# Patient Record
Sex: MALE | URBAN - METROPOLITAN AREA
[De-identification: names, ages, dates, MRNs, and addresses within clinical notes are randomized per-mention and may not be internally consistent; named-entity substitution may affect disease eponyms.]

---

## 2024-07-22 DIAGNOSIS — S02.651A: Primary | ICD-10-CM

## 2024-07-22 NOTE — PROGRESS NOTES
Updates:  - Patient had tooth extracted 7/9/24 and 7/15/24 patient reports he was eating when he experienced sharp pain to right mandible  - Radiograph confirmed right mandible fracture. Plan for open reduction internal fixation of mandible in OR 8/1/24

## 2024-07-23 ENCOUNTER — HOSPITAL ENCOUNTER (OUTPATIENT)
Facility: HOSPITAL | Age: 74
Setting detail: OUTPATIENT SURGERY
End: 2024-07-23
Attending: DENTIST | Admitting: DENTIST
Payer: MEDICARE

## 2024-07-23 DIAGNOSIS — S02.601A: Primary | ICD-10-CM

## 2024-07-24 ENCOUNTER — CLINICAL SUPPORT (OUTPATIENT)
Dept: PREADMISSION TESTING | Facility: HOSPITAL | Age: 74
End: 2024-07-24
Payer: MEDICARE

## 2024-07-24 RX ORDER — SOTALOL HYDROCHLORIDE 120 MG/1
1 TABLET ORAL EVERY 12 HOURS
COMMUNITY
Start: 2023-09-25

## 2024-07-24 RX ORDER — AMOXICILLIN 500 MG/1
500 CAPSULE ORAL EVERY 8 HOURS
COMMUNITY
Start: 2024-07-09

## 2024-07-24 RX ORDER — FUROSEMIDE 40 MG/1
2 TABLET ORAL DAILY
COMMUNITY
Start: 2023-10-09

## 2024-07-24 RX ORDER — DULAGLUTIDE 1.5 MG/.5ML
1.5 INJECTION, SOLUTION SUBCUTANEOUS
COMMUNITY

## 2024-07-24 RX ORDER — APIXABAN 2.5 MG/1
1 TABLET, FILM COATED ORAL 2 TIMES DAILY
COMMUNITY

## 2024-07-24 RX ORDER — INSULIN LISPRO 100 [IU]/ML
INJECTION, SOLUTION INTRAVENOUS; SUBCUTANEOUS AS NEEDED
COMMUNITY

## 2024-07-24 RX ORDER — LEVOTHYROXINE SODIUM 25 UG/1
1 TABLET ORAL
COMMUNITY

## 2024-07-24 RX ORDER — INSULIN GLARGINE 300 U/ML
INJECTION, SOLUTION SUBCUTANEOUS
COMMUNITY

## 2024-07-24 RX ORDER — GABAPENTIN 300 MG/1
1 CAPSULE ORAL 2 TIMES DAILY
COMMUNITY
Start: 2024-06-03

## 2024-07-24 RX ORDER — GLUCAGON INJECTION, SOLUTION 1 MG/.2ML
INJECTION, SOLUTION SUBCUTANEOUS AS NEEDED
COMMUNITY
Start: 2024-04-24

## 2024-07-24 RX ORDER — ATORVASTATIN CALCIUM 40 MG/1
1 TABLET, FILM COATED ORAL NIGHTLY
COMMUNITY
Start: 2024-06-10

## 2024-07-24 RX ORDER — ESOMEPRAZOLE MAGNESIUM 40 MG/1
40 CAPSULE, DELAYED RELEASE ORAL
COMMUNITY
Start: 2022-08-25

## 2024-07-24 RX ORDER — FAMOTIDINE 40 MG/1
40 TABLET, FILM COATED ORAL NIGHTLY
COMMUNITY

## 2024-07-24 RX ORDER — HYDROCODONE BITARTRATE AND ACETAMINOPHEN 5; 325 MG/1; MG/1
1 TABLET ORAL EVERY 6 HOURS PRN
COMMUNITY

## 2024-07-24 NOTE — CPM/PAT NURSE NOTE
CPM/PAT Nurse Note      Name: Masoud Barger (Masoud Barger)  /Age: 1950/74 y.o.       Past Medical History:   Diagnosis Date    A-fib (Multi)     s/p DCCV in 2020    Anemia     Asthma (HHS-HCC)     related to allergies    Bradycardia     CHF (congestive heart failure) (Multi)     Chronic diastolic heart failure    Complete heart block (Multi)     s/p Medtr[employer] DC PPM placement in 3/2018,    Coronary artery disease     s/p 3 vessel CABG and mitral valve repair in 2018    ED (erectile dysfunction)     GERD (gastroesophageal reflux disease)     Hyperlipidemia     Hypertension     Palpitations     Presence of cardiac pacemaker     Rectal bleeding     Sleep apnea     uses CPAP    SSS (sick sinus syndrome) (Multi)     Stroke (Multi)     Testicular cancer (Multi)     Thyroid nodule     Type 2 diabetes mellitus (Multi)        Past Surgical History:   Procedure Laterality Date    CARDIAC CATHETERIZATION  2003    CARDIAC CATHETERIZATION  2018    CARDIAC CATHETERIZATION  2021    CARDIOVERSION  2020    CARDIOVERSION  2020    CHOLECYSTECTOMY  2006    CORONARY ARTERY BYPASS GRAFT  2018    HAND SURGERY      INSERT / REPLACE / REMOVE PACEMAKER  2018    MITRAL VALVE REPAIR      ORCHIECTOMY SIMPLE / PARTIAL / RADICAL W/ SCROTAL / INGUINAL APPROACH         Patient  has no history on file for sexual activity.    No family history on file.    Not on File    Prior to Admission medications    Medication Sig Start Date End Date Taking? Authorizing Provider   amoxicillin (Amoxil) 500 mg capsule Take 1 capsule (500 mg) by mouth every 8 hours. until finished 24  Yes Historical Provider, MD   atorvastatin (Lipitor) 40 mg tablet Take 1 tablet (40 mg) by mouth once daily at bedtime. 6/10/24  Yes Historical Provider, MD   esomeprazole (NexIUM) 40 mg DR capsule Take 1 capsule (40 mg) by mouth once daily. 22  Yes Historical Provider, MD   furosemide (Lasix) 40 mg tablet  Take 2 tablets (80 mg) by mouth once daily. 10/9/23  Yes Historical Provider, MD   gabapentin (Neurontin) 300 mg capsule Take 1 capsule (300 mg) by mouth 2 times a day. 6/3/24  Yes Historical Provider, MD   Gvoke HypoPen 2-Pack 1 mg/0.2 mL auto-injector if needed. 4/24/24  Yes Historical Provider, MD   sotalol (Betapace) 120 mg tablet Take 1 tablet (120 mg) by mouth every 12 hours. 9/25/23  Yes Historical Provider, MD   aspirin 81 mg capsule Take 81 mg by mouth once daily.    Historical Provider, MD   dulaglutide (Trulicity) 1.5 mg/0.5 mL pen injector injection Inject 1.5 mg under the skin 1 (one) time per week.    Historical Provider, MD   Eliquis 2.5 mg tablet Take 1 tablet (2.5 mg) by mouth 2 times a day.    Historical Provider, MD   famotidine (Pepcid) 40 mg tablet Take 1 tablet (40 mg) by mouth once daily at bedtime.    Historical Provider, MD   HumaLOG KwikPen Insulin 100 unit/mL injection Inject under the skin if needed.    Historical Provider, MD   HYDROcodone-acetaminophen (Norco) 5-325 mg tablet Take 1 tablet by mouth every 6 hours if needed.    Historical Provider, MD   levothyroxine (Synthroid, Levoxyl) 25 mcg tablet Take 1 tablet (25 mcg) by mouth once daily in the morning. Take before meals.    Historical Provider, MD Salgado SoloStar U-300 Insulin 300 unit/mL (1.5 mL) injection INJECT 95 UNITS SUBCUTANEOUSLY IN THE MORNING    Historical Provider, MD        PAT ROS     DASI Risk Score    No data to display       Caprini DVT Assessment    No data to display       Modified Frailty Index    No data to display       CHADS2 Stroke Risk         N/A 3 to 100%: High Risk   2 to < 3%: Medium Risk   0 to < 2%: Low Risk     Last Change: N/A          This score determines the patient's risk of having a stroke if the patient has atrial fibrillation.        This score is not applicable to this patient. Components are not calculated.          Revised Cardiac Risk Index    No data to display       Apfel Simplified  Score    No data to display       Risk Analysis Index Results This Encounter    No data found in the last 1 encounters.       Scheduled for mandible/maxilla fracture ORIF-right with Dr. Muller on 24.  PCP: David Gary MD P: [phone], F: [phone]    Cardiology: Ranjan José MD P: [phone], F: [phone] seen for 6 month follow up.    EP: Tara Costa MD [facility] -LOV 23 seen for device management    Endocrinology: Ian Kruse MD [facility], P: [phone], F: [phone]  -Stress Test: 24  CONCLUSIONS:    1. SPECT Perfusion Study: Normal.    2. There is no scintigraphic evidence for inducible ischemia.    3. No evidence of scarred myocardium.    4. Left ventricle is mildly dilated. The left ventricle systolic   function is normal.   Gated Stress FBP Gated Rest FBP    LVEF % 40               64     -EC24  Wide QRS rhythm  Right Bundle branch block  T wave abnormality, consider inferolateral ischemia   Abnormal ECG    -ECHO: 23  CONCLUSIONS:   - Technically difficult exam due to body habitus.   - Exam indication: ABNORMAL EKG/CP   - The left ventricle is normal in size. Left ventricular systolic function is   normal. EF = 56 ± 5% (2D 4-ch.) Definity contrast used for endocardial border   detection.   - The right ventricle is mildly dilated. Right ventricular systolic function is   low normal.   - The left atrial cavity is moderately dilated.   - Post mitral valve repair. There is trace (trace - 1+) mitral valve   regurgitation. There is no mitral stenosis. The peak gradient is 13 mmHg and the   mean gradient is 4 mmHg.   - There is moderate (2+) tricuspid valve regurgitation.   - Estimated right ventricular systolic pressure is 51 mmHg consistent with   moderate pulmonary hypertension. Estimated right atrial pressure is 15 mmHg based   on IVC assessment.   - Exam was compared with the prior  echocardiographic exam performed on    02-17-20. Prior examination was transesophageal echocardiogram. No significant   change in LVEF or tricuspid regurgitation.     -Cardiac Cath: 09/30/21  Conclusion:   LV ejection fraction 55 to 60% with moderate hypokinesis of basal inferior wall.   Elevated LVEDP at 19 mmHg.   Severe two-vessel native coronary artery disease involving LAD and RCA.   All patent grafts including SVG to diagonal 1 (no SVG graft to OM), LIMA to distal LAD,   OSCAR to right PDA.     Nurse Plan of Action:   Risk stratification sent to cardiology office on 07/24/24.  Requested last office note from Dr. Cuevas and Duong  Medication Instructions:  Instructed to hold vitamins, supplements, and NSAIDs 7 days prior to surgery and Trulicity last dose.      Yasmeen Worley RN  Pre Admission Testing

## 2024-07-29 ENCOUNTER — PRE-ADMISSION TESTING (OUTPATIENT)
Dept: PREADMISSION TESTING | Facility: HOSPITAL | Age: 74
End: 2024-07-29
Payer: MEDICARE

## 2024-07-29 ENCOUNTER — HOSPITAL ENCOUNTER (OUTPATIENT)
Dept: RADIOLOGY | Facility: HOSPITAL | Age: 74
Discharge: HOME | End: 2024-07-29
Payer: MEDICARE

## 2024-07-29 ENCOUNTER — APPOINTMENT (OUTPATIENT)
Dept: PREADMISSION TESTING | Facility: HOSPITAL | Age: 74
End: 2024-07-29
Payer: MEDICARE

## 2024-07-29 VITALS
HEART RATE: 71 BPM | WEIGHT: 315 LBS | DIASTOLIC BLOOD PRESSURE: 50 MMHG | BODY MASS INDEX: 45.1 KG/M2 | HEIGHT: 70 IN | SYSTOLIC BLOOD PRESSURE: 109 MMHG | TEMPERATURE: 97.4 F | OXYGEN SATURATION: 96 %

## 2024-07-29 DIAGNOSIS — S02.651A: ICD-10-CM

## 2024-07-29 DIAGNOSIS — I10 PRIMARY HYPERTENSION: ICD-10-CM

## 2024-07-29 DIAGNOSIS — Z01.818 PREOPERATIVE TESTING: Primary | ICD-10-CM

## 2024-07-29 DIAGNOSIS — R79.9 ABNORMAL FINDING OF BLOOD CHEMISTRY, UNSPECIFIED: ICD-10-CM

## 2024-07-29 DIAGNOSIS — R79.1 ABNORMAL COAGULATION PROFILE: ICD-10-CM

## 2024-07-29 LAB
ANION GAP SERPL CALC-SCNC: 17 MMOL/L (ref 10–20)
APTT PPP: 33 SECONDS (ref 27–38)
BUN SERPL-MCNC: 32 MG/DL (ref 6–23)
CALCIUM SERPL-MCNC: 9 MG/DL (ref 8.6–10.6)
CHLORIDE SERPL-SCNC: 92 MMOL/L (ref 98–107)
CO2 SERPL-SCNC: 26 MMOL/L (ref 21–32)
CREAT SERPL-MCNC: 2.54 MG/DL (ref 0.5–1.3)
EGFRCR SERPLBLD CKD-EPI 2021: 26 ML/MIN/1.73M*2
ERYTHROCYTE [DISTWIDTH] IN BLOOD BY AUTOMATED COUNT: 17 % (ref 11.5–14.5)
EST. AVERAGE GLUCOSE BLD GHB EST-MCNC: 146 MG/DL
GLUCOSE SERPL-MCNC: 177 MG/DL (ref 74–99)
HBA1C MFR BLD: 6.7 %
HCT VFR BLD AUTO: 33.2 % (ref 41–52)
HGB BLD-MCNC: 10.2 G/DL (ref 13.5–17.5)
INR PPP: 1.5 (ref 0.9–1.1)
MCH RBC QN AUTO: 22.1 PG (ref 26–34)
MCHC RBC AUTO-ENTMCNC: 30.7 G/DL (ref 32–36)
MCV RBC AUTO: 72 FL (ref 80–100)
NRBC BLD-RTO: 0 /100 WBCS (ref 0–0)
PLATELET # BLD AUTO: 249 X10*3/UL (ref 150–450)
POTASSIUM SERPL-SCNC: 4.3 MMOL/L (ref 3.5–5.3)
PROTHROMBIN TIME: 17.5 SECONDS (ref 9.8–12.8)
RBC # BLD AUTO: 4.62 X10*6/UL (ref 4.5–5.9)
SODIUM SERPL-SCNC: 131 MMOL/L (ref 136–145)
WBC # BLD AUTO: 17.5 X10*3/UL (ref 4.4–11.3)

## 2024-07-29 PROCEDURE — 76376 3D RENDER W/INTRP POSTPROCES: CPT | Performed by: RADIOLOGY

## 2024-07-29 PROCEDURE — 83036 HEMOGLOBIN GLYCOSYLATED A1C: CPT

## 2024-07-29 PROCEDURE — 70486 CT MAXILLOFACIAL W/O DYE: CPT | Performed by: RADIOLOGY

## 2024-07-29 PROCEDURE — 76377 3D RENDER W/INTRP POSTPROCES: CPT

## 2024-07-29 PROCEDURE — 85027 COMPLETE CBC AUTOMATED: CPT

## 2024-07-29 PROCEDURE — 80048 BASIC METABOLIC PNL TOTAL CA: CPT

## 2024-07-29 PROCEDURE — 70486 CT MAXILLOFACIAL W/O DYE: CPT

## 2024-07-29 PROCEDURE — 85610 PROTHROMBIN TIME: CPT

## 2024-07-29 PROCEDURE — 36415 COLL VENOUS BLD VENIPUNCTURE: CPT

## 2024-07-29 PROCEDURE — 99205 OFFICE O/P NEW HI 60 MIN: CPT | Performed by: NURSE PRACTITIONER

## 2024-07-29 ASSESSMENT — DUKE ACTIVITY SCORE INDEX (DASI)
CAN YOU WALK INDOORS, SUCH AS AROUND YOUR HOUSE: YES
CAN YOU WALK A BLOCK OR TWO ON LEVEL GROUND: YES
DASI METS SCORE: 5.1
CAN YOU DO YARD WORK LIKE RAKING LEAVES, WEEDING OR PUSHING A MOWER: NO
CAN YOU DO MODERATE WORK AROUND THE HOUSE LIKE VACUUMING, SWEEPING FLOORS OR CARRYING GROCERIES: YES
CAN YOU RUN A SHORT DISTANCE: NO
CAN YOU DO LIGHT WORK AROUND THE HOUSE LIKE DUSTING OR WASHING DISHES: YES
TOTAL_SCORE: 18.95
CAN YOU CLIMB A FLIGHT OF STAIRS OR WALK UP A HILL: YES
CAN YOU TAKE CARE OF YOURSELF (EAT, DRESS, BATHE, OR USE TOILET): YES
CAN YOU DO HEAVY WORK AROUND THE HOUSE LIKE SCRUBBING FLOORS OR LIFTING AND MOVING HEAVY FURNITURE: NO
CAN YOU HAVE SEXUAL RELATIONS: NO
CAN YOU PARTICIPATE IN MODERATE RECREATIONAL ACTIVITIES LIKE GOLF, BOWLING, DANCING, DOUBLES TENNIS OR THROWING A BASEBALL OR FOOTBALL: NO
CAN YOU PARTICIPATE IN STRENOUS SPORTS LIKE SWIMMING, SINGLES TENNIS, FOOTBALL, BASKETBALL, OR SKIING: NO

## 2024-07-29 ASSESSMENT — LIFESTYLE VARIABLES: SMOKING_STATUS: NONSMOKER

## 2024-07-29 NOTE — CPM/PAT H&P
CPM/PAT Evaluation       Name: Masoud Barger (Masoud Barger)  /Age: 1950/74 y.o.     Visit Type:   In-Person       Chief Complaint: mandible fracture    HPI  The patient is a 74 year old  male with mandible fractures who presents for perioperative evaluation in anticipation of Open Reduction Internal Fixation Mandible -Right on 24 with Dr. Muller.    Past Medical History:   Diagnosis Date    A-fib (Multi)     s/p DCCV in 2020    Anemia     Arthritis     Asthma (HHS-HCC)     related to allergies    Bradycardia     CHF (congestive heart failure) (Multi)     Chronic diastolic heart failure    Chronic pain disorder     CKD (chronic kidney disease)     Complete heart block (Multi)     s/p [employer] DC PPM placement in 3/2018,    Coronary artery disease     s/p 3 vessel CABG and mitral valve repair in 2018    ED (erectile dysfunction)     GERD (gastroesophageal reflux disease)     History of blood transfusion     after CABG in     Hyperlipidemia     Hypertension     Hypothyroidism     Obesity     Palpitations     Peripheral neuropathy     Presence of cardiac pacemaker     Rectal bleeding     with anticoagulation in     Sleep apnea     uses CPAP    SSS (sick sinus syndrome) (Multi)     Stroke (Multi)     Testicular cancer (Multi)     Thyroid nodule     Type 2 diabetes mellitus (Multi)     Vision loss        Past Surgical History:   Procedure Laterality Date    CARDIAC CATHETERIZATION  2003    CARDIAC CATHETERIZATION  2018    CARDIAC CATHETERIZATION  2021    CARDIOVERSION  2020    CARDIOVERSION  2020    CHOLECYSTECTOMY  2006    CORONARY ARTERY BYPASS GRAFT  2018    HAND SURGERY      INSERT / REPLACE / REMOVE PACEMAKER  2018    MITRAL VALVE REPAIR      ORCHIECTOMY SIMPLE / PARTIAL / RADICAL W/ SCROTAL / INGUINAL APPROACH         Patient Sexual activity questions deferred to the physician.    Family History   Problem Relation Name Age of  Onset    Heart disease Mother      Hypertension Mother      Cancer Father      Hypertension Sister      Heart attack Brother      Hypertension Brother         Allergies   Allergen Reactions    Levaquin [Levofloxacin] Other     Extreme leg pain    Shellfish Derived Nausea/vomiting    Voltaren [Diclofenac Sodium] Other     Elevated blood pressure    Adhesive Tape-Silicones Rash     Rash with silk tape       Prior to Admission medications    Medication Sig Start Date End Date Taking? Authorizing Provider   amoxicillin (Amoxil) 500 mg capsule Take 1 capsule (500 mg) by mouth every 8 hours. until finished 7/9/24   Historical Provider, MD   aspirin 81 mg capsule Take 81 mg by mouth once daily.    Historical Provider, MD   atorvastatin (Lipitor) 40 mg tablet Take 1 tablet (40 mg) by mouth once daily at bedtime. 6/10/24   Historical Provider, MD   dulaglutide (Trulicity) 1.5 mg/0.5 mL pen injector injection Inject 1.5 mg under the skin 1 (one) time per week.    Historical Provider, MD   Eliquis 2.5 mg tablet Take 1 tablet (2.5 mg) by mouth 2 times a day.    Historical Provider, MD   esomeprazole (NexIUM) 40 mg DR capsule Take 1 capsule (40 mg) by mouth once daily. 8/25/22   Historical Provider, MD   famotidine (Pepcid) 40 mg tablet Take 1 tablet (40 mg) by mouth once daily at bedtime.    Historical Provider, MD   furosemide (Lasix) 40 mg tablet Take 2 tablets (80 mg) by mouth once daily. 10/9/23   Historical Provider, MD   gabapentin (Neurontin) 300 mg capsule Take 1 capsule (300 mg) by mouth 2 times a day. 6/3/24   Historical Provider, MD   Gvoke HypoPen 2-Pack 1 mg/0.2 mL auto-injector if needed. 4/24/24   Historical Provider, MD Kristen Gallardo Insulin 100 unit/mL injection Inject under the skin if needed.    Historical Provider, MD   HYDROcodone-acetaminophen (Norco) 5-325 mg tablet Take 1 tablet by mouth every 6 hours if needed.    Historical Provider, MD   levothyroxine (Synthroid, Levoxyl) 25 mcg tablet Take 1  tablet (25 mcg) by mouth once daily in the morning. Take before meals.    Historical Provider, MD   sotalol (Betapace) 120 mg tablet Take 1 tablet (120 mg) by mouth every 12 hours. 9/25/23   Historical Provider, MD Naomi Antunez U-300 Insulin 300 unit/mL (1.5 mL) injection INJECT 95 UNITS SUBCUTANEOUSLY IN THE MORNING    Historical Provider, MD MARADIAGA ROS:   Constitutional:   neg    Neuro/Psych:   neg    Eyes:   neg     use of corrective lenses  Ears:   neg    Nose:   neg    Mouth:   neg     mouth pain  Throat:   neg    Neck:   neg    Cardio:   neg     peripheral edema  Respiratory:   neg    Endocrine:   neg    GI:   neg    :   neg    Musculoskeletal:    arthralgias   myalgias  Hematologic:   neg    Skin:  neg        Physical Exam  Vitals reviewed.   Constitutional:       Appearance: Normal appearance. He is obese.   HENT:      Head: Normocephalic and atraumatic.      Nose: Nose normal.      Mouth/Throat:      Mouth: Mucous membranes are moist.      Pharynx: Oropharynx is clear.   Eyes:      Extraocular Movements: Extraocular movements intact.      Pupils: Pupils are equal, round, and reactive to light.   Cardiovascular:      Rate and Rhythm: Normal rate and regular rhythm.      Pulses: Normal pulses.      Heart sounds: Normal heart sounds.   Pulmonary:      Effort: Pulmonary effort is normal.      Breath sounds: Normal breath sounds.   Musculoskeletal:         General: Normal range of motion.      Cervical back: Normal range of motion.      Right lower leg: Edema present.      Left lower leg: Edema present.      Comments: Trace non pitting, improved per patient   Skin:     General: Skin is warm and dry.   Neurological:      General: No focal deficit present.      Mental Status: He is alert and oriented to person, place, and time.      Gait: Gait abnormal.      Comments: Uses cane, motorized scooter   Psychiatric:         Mood and Affect: Mood normal.         Behavior: Behavior normal.          PAT AIRWAY:  "  Airway:     Mallampati::  IV    TM distance::  >3 FB    Neck ROM::  Full   upper dentures      Visit Vitals  /50   Pulse 71   Temp 36.3 °C (97.4 °F) (Temporal)   Ht 1.778 m (5' 10\")   Wt (!) 154 kg (340 lb 2.7 oz)   SpO2 96%   BMI 48.81 kg/m²   Smoking Status Never   BSA 2.76 m²          DASI Risk Score      Flowsheet Row Most Recent Value   DASI SCORE 18.95   METS Score (Will be calculated only when all the questions are answered) 5.1          Caprini DVT Assessment      Flowsheet Row Most Recent Value   DVT Score 19   Current Status Major surgery planned, lasting over 3 hours   History Prior major surgery, Congestive heart failure, Previous malignancy, Stroke   Age 60-75 years   BMI 41-50 (Morbid obesity)          Modified Frailty Index      Flowsheet Row Most Recent Value   Modified Frailty Index Calculator .5454          CHADS2 Stroke Risk  Current as of 54 minutes ago        8.5% 3 to 100%: High Risk   2 to < 3%: Medium Risk   0 to < 2%: Low Risk     No Change          This score determines the patient's risk of having a stroke if the patient has atrial fibrillation.          Points Metrics   0 Has Congestive Heart Failure:  No     Patients with congestive heart failure get 1 point.    Current as of 54 minutes ago   1 Has Hypertension:  Yes     Patients with hypertension get 1 point.    Current as of 54 minutes ago   0 Age:  74     Patients who are 75 years of age or older get 1 point.    Current as of 54 minutes ago   1 Has Diabetes:  Yes      Patients with diabetes get 1 point.    Current as of 54 minutes ago   2 Had Stroke:  Yes  Had TIA:  No  Had Thromboembolism:  No     Patients who have had a stroke, TIA, or thromboembolism get 2 points.    Current as of 54 minutes ago             Revised Cardiac Risk Index      Flowsheet Row Most Recent Value   Revised Cardiac Risk Calculator 4          Apfel Simplified Score      Flowsheet Row Most Recent Value   Apfel Simplified Score Calculator 2          Risk " Analysis Index Results This Encounter    No data found in the last 1 encounters.       Stop Bang Score      Flowsheet Row Most Recent Value   Do you snore loudly? 1   Do you often feel tired or fatigued after your sleep? 1   Has anyone ever observed you stop breathing in your sleep? 1   Do you have or are you being treated for high blood pressure? 1   Recent BMI (Calculated) 0   Age older than 50 years old? 1=Yes   Is your neck circumference greater than 17 inches (Male) or 16 inches (Female)? 0   Gender - Male 1=Yes        No results found for this or any previous visit (from the past 168 hour(s)).     Diagnostic Results    -Stress Test: 24  CONCLUSIONS:    1. SPECT Perfusion Study: Normal.    2. There is no scintigraphic evidence for inducible ischemia.    3. No evidence of scarred myocardium.    4. Left ventricle is mildly dilated. The left ventricle systolic   function is normal.   Gated Stress FBP Gated Rest FBP    LVEF % 40               64      -EC24  Wide QRS rhythm  Right Bundle branch block  T wave abnormality, consider inferolateral ischemia   Abnormal ECG     -ECHO: 23  CONCLUSIONS:   - Technically difficult exam due to body habitus.   - Exam indication: ABNORMAL EKG/CP   - The left ventricle is normal in size. Left ventricular systolic function is   normal. EF = 56 ± 5% (2D 4-ch.) Definity contrast used for endocardial border   detection.   - The right ventricle is mildly dilated. Right ventricular systolic function is   low normal.   - The left atrial cavity is moderately dilated.   - Post mitral valve repair. There is trace (trace - 1+) mitral valve   regurgitation. There is no mitral stenosis. The peak gradient is 13 mmHg and the   mean gradient is 4 mmHg.   - There is moderate (2+) tricuspid valve regurgitation.   - Estimated right ventricular systolic pressure is 51 mmHg consistent with   moderate pulmonary hypertension. Estimated right atrial pressure is 15 mmHg based   on IVC  assessment.   - Exam was compared with the prior CC echocardiographic exam performed on   02-17-20. Prior examination was transesophageal echocardiogram. No significant   change in LVEF or tricuspid regurgitation.      -Cardiac Cath: 09/30/21  Conclusion:   LV ejection fraction 55 to 60% with moderate hypokinesis of basal inferior wall.   Elevated LVEDP at 19 mmHg.   Severe two-vessel native coronary artery disease involving LAD and RCA.   All patent grafts including SVG to diagonal 1 (no SVG graft to OM), LIMA to distal LAD,   OSCAR to right PDA.      Assessment and Plan:     Anesthesia:  The patient notes anesthesia complications in the past related to PONV. No issues with most recent surgeries.     Neuro:   The patient has diagnoses or significant findings on chart review or clinical presentation and evaluation significant for history of CVA in 2002. No residual issues. On aspirin 81mg by mouth daily. . The patient is at increased risk for postoperative delirium secondary to age 65 or older, decreased functional status, polypharmacy, renal Insufficiency. The patient is at increased risk for perioperative stroke secondary to prior CVA/TIA, a-fib, chronic renal failure, hypertension , perioperative interruption of antithrombotic, increased age, hyperlipidemia, diabetes mellitus, general anesthesia, operative time >2.5 hours. Handouts for preoperative brain exercises given to patient.    HEENT/Airway  The patient has diagnoses, significant findings on chart review, clinical presentation or evaluation of obesity, short thick neck. No documented or reported history of airway difficulty.     Cardiovascular  The patient is scheduled for non-cardiac surgery associated with elevated risk. The patient has no major cardiac contraindications to non- cardiac surgery.  RCRI  The patient meets 3 or more RCRI criteria and therefore is at high risk for major adverse cardiac complications.  METS  The patient's functional capacity  capacity is greater than 4 METS.  EKG  The patient has no acute EKG or echocardiographic changes concerning for myocardial ischemia. Known CAD s/p 3 vessel CABG 2/2018. Recent negative nuclear stress 6/27/24.  Heart Failure  The patient has a known history of heart failure, which is currently compensated, due to diastolic dysfunction. Appears euvolemic on today's exam.  Hypertension Evaluation  The patient has a known history of hypertension that is controlled.  Patient's hypertension is most consistent with stage 1.  Heart Rhythm Evaluation  Patient has a history postoperative  atrial fibrillation, Status post synchronized cardioversion. History of  sick sinus syndrome, status post dual-chamber pacemaker implantation3/2018.  Heart Valve Evaluation  The patient has history of mitral valve repair 2/2018.   Cardiology Evaluation  The patient follows with cardiology, Dr. Ranjan José. Patient was last seen 5/28/24. See media tab for clearance.  EP: Tara Costa MD [facility] 11/29/23 seen for device management     The patient has a 30-day risk for MACE of 3 or greater predictors, 15% risk for cardiac death, nonfatal myocardial infarction, and nonfatal cardiac arrest.  SHOSHANA score which indicates a 0.2% risk of intraoperative or 30-day postoperative MACE (major adverse cardiac event).    Pulmonary   The patient has findings on chart review, clinical presentation and evaluation significant for KULWINDER, asthma. Denies any recent URIs, exacerbations, or hospitalizations. The patient is at increased risk of perioperative pulmonary complications secondary to KULWINDER, advanced age greater than 60, functional dependency, morbid obesity. Patient educated to bring CPAP/BIPAP day of surgery.     Recommend prioritizing non opioid analgesic techniques (regional and local anesthesia, nonsteroidal medications, etc) before the administration of opioids and close monitoring for hypoventilation after surgery due to KULWINDER/supsected KULWINDER. If  intravenous narcotics are needed beyond the immediate meek-operative period, the patient may benefit from continuous pulse oximetry to monitor for hypoxic events till baseline Sp02 is normal on room air and  a respiratory therapy evaluation.    ARISCAT 26, Intermediate, 13.3% risk of in-hospital postoperative pulmonary complications  PRODIGY 27, high risk of respiratory depression episode. Patient given PI sheet for preoperative deep breathing exercises.    Hematology  The patient has diagnoses or significant findings on chart review or clinical presentation and evaluation significant for anemia. CBC obtained  Antiplatelet management   The patient is currently receiving antiplatelet therapy for CAD. The patient was instructed to continue aspirin in the perioperative period.  Anticoagulation management  The patient is currently receiving anticoagulation therapy for history of CVA/TIA, afib. The patient has been instructed to stop Eliquis 3 days prior to surgery. Patient provided with DVT educational handout.      Caprini score 19, high risk of perioperative VTE.     Patient instructed to ambulate as soon as possible postoperatively to decrease thromboembolic risk. Initiate mechanical DVT prophylaxis as soon as possible and initiate chemical prophylaxis when deemed safe from a bleeding standpoint post surgery.     Transfusion Evaluation  T&S not obtained. Low likelihood for perioperative transfusion of blood or blood products.    Gastrointestinal  The patient has diagnoses or significant findings on chart review or clinical presentation and evaluation significant for GERD managed on medication. Recent issue with constipation and nausea related to pain meds. Now relieved with stool softener and enema.     Eat 10- 24,  self-perceived oropharyngeal dysphagia scale (0-40)     Genitourinary  The patient has diagnoses or significant findings on chart review or clinical presentation and evaluation significant for history of  testicular cancer s/p right orchiectomy at age 22. No current  complaints.     Renal  The patient has a history of chronic kidney disease most consistent with stage 3.  Patient's renal function appears unchanged when compared to prior labs. The patient has specific risk factors associated with increased risk of perioperative renal complications related to age greater than 55, male gender, hypertension, diabetes mellitus, CKD. Preventative measures include preoperative hydration.    Musculoskeletal  The patient has diagnoses or significant findings on chart review or clinical presentation and evaluation significant for OA, uses cane. Avoid NSAIDs 7 days prior to surgery.     Endocrine  Diabetes Evaluation  The patient has a history of diabetes mellitus that currently appears controlled. Has patrice RUE.  A1c obtained.  Thyroid Disease Evaluation  The patient has a history of thyroid disease that appears controlled. Last TSH WNL 5/21/24.  Endocrinology: Ian Kruse MD [facility], P: [phone], F: [phone]     ID  No diagnoses or significant findings on chart review or clinical presentation and evaluation.    -Preoperative medication instructions were provided and reviewed with the patient.  Any additional testing or evaluation was explained to the patient.  NPO Instructions were discussed, and the patient's questions were answered prior to conclusion of this encounter. Patient verbalized understanding of preoperative instructions. After Visit Summary given.      7/29 1730 Dr. Muller/Dr. Rivers emailed regarding kidney function.    7/30/24 Spoke with patient states he is feeling unwell. Has had diarrhea since stool softener and enema 3 days ago. He has increased fatigue and is headed to urgent care for further evaluation. Educated patient regarding result of labs and worsening kidney function. May be in the setting of dehydration? Dr. Muller emailed regarding update.    7/31/24.  Patient seen in ER, repeat labs with improved kidney function. Patient was recommended to increase fluid intake and discharged. Spoke with patient today with resolving symptoms, states he feels much better and has been increasing his po intake.  ntains abnormal data COMPREHENSIVE METABOLIC PANEL  Order: [id number]  Component  Ref Range & Units 1 d ago   Protein, Total  6.3 - 8.0 g/dL 7.1   Albumin  3.9 - 4.9 g/dL 3.7 Low    Calcium, Total  8.5 - 10.2 mg/dL 9.0   Bilirubin, Total  0.2 - 1.3 mg/dL 0.7   Comment: Use of this assay is not recommended for patients undergoing treatment with eltrombopag due to the potential for falsely elevated results.   Alkaline Phosphatase  38 - 113 U/L 72   AST  14 - 40 U/L 16   ALT  10 - 54 U/L 17   Glucose  74 - 99 mg/dL 131 High    Comment: The American Diabetes Association (ADA) provides guidance for cutoff values for fasting glucose and random glucose. The ADA defines fasting as no caloric intake for at least 8 hours. Fasting plasma glucose results between 100 to 125 mg/dL indicate increased risk for diabetes (prediabetes).  Fasting plasma glucose results greater than or equal to 126 mg/dL meet the criteria for diagnosis of diabetes. In the absence of unequivocal hyperglycemia, results should be confirmed by repeat testing. In a patient with classic symptoms of hyperglycemia or hyperglycemic crisis, random plasma glucose results greater than or equal to 200 mg/dL meet the criteria for diagnosis of diabetes.  Reference: Standards of Medical Care in Diabetes 2016, American Diabetes Association. Diabetes Care. 2016.39(Suppl 1).   BUN  9 - 24 mg/dL 33 High    Creatinine  0.73 - 1.22 mg/dL 1.89 High    Comment: Use of this assay is not recommended for patients undergoing treatment with phenindione, due to the potential for falsely depressed results.   Sodium  136 - 144 mmol/L 130 Low    Potassium  3.7 - 5.1 mmol/L 3.5 Low    Chloride  98 - 107 mmol/L 93 Low    CO2  22 - 30 mmol/L 30    Anion Gap  8 - 15 mmol/L 7 Low    Estimated Glomerular Filtration Rate  >=60 mL/min/1.73m² 37 Low    COMPLETE BLOOD COUNT AND DIFFERENTIAL  Order: [id number]  Component  Ref Range & Units 1 d ago   WBC  3.70 - 11.00 k/uL 10.44   RBC  4.20 - 6.00 m/uL 4.45   Hemoglobin  13.0 - 17.0 g/dL 9.9 Low    Hematocrit  39.0 - 51.0 % 31.5 Low    MCV  80.0 - 100.0 fL 70.8 Low    MCH  26.0 - 34.0 pg 22.2 Low    MCHC  30.5 - 36.0 g/dL 31.4   RDW-CV  11.5 - 15.0 % 16.6 High    Platelet Count  150 - 400 k/uL 217   Comment: No clot detected.   MPV    Comment: Unable to Report.   Neutrophils %  % 82.6   Abs Neut  1.45 - 7.50 k/uL 8.63 High    Lymphocytes %  % 5.6   Abs Lymph  1.00 - 4.00 k/uL 0.58 Low    Monocytes %  % 8.9   Abs Mono  <0.87 k/uL 0.93 High    Eosinophils %  % 2.2   Abs Eosin  <0.46 k/uL 0.23   Basophils %  % 0.3   Abs Baso  <0.11 k/uL 0.03   Immature Granulocytes %  % 0.4   Abs Immature Gran  <0.10 k/uL 0.04   NRBC    Absolute nRBC    Diff Type Auto

## 2024-07-29 NOTE — PREPROCEDURE INSTRUCTIONS
Fasting Guidelines    Why must I stop eating and drinking near surgery time?  With sedation, food or liquid in your stomach can enter your lungs causing serious complications  Increases nausea and vomiting    When do I need to stop eating and drinking before my surgery?  Do not eat any food or drink any liquids after midnight the night before your surgery/procedure.  You may have sips of water to take medications.    Additional Instructions:     Avoid herbal supplements, multivitamins and NSAIDS (non-steroidal anti-inflammatory drugs) such as Advil, Aleve, Ibuprofen, Naproxen, Excedrin, Meloxicam or Celebrex for at least 7 days prior to surgery. May take Tylenol as needed.    Avoid tobacco and alcohol products for 24 hours prior to surgery.    CONTACT SURGEON'S OFFICE IF YOU DEVELOP:  * Fever = 100.4 F   * New respiratory symptoms (e.g. cough, shortness of breath, respiratory distress, sore throat)  * Recent loss of taste or smell  *Flu like symptoms such as headache, fatigue or gastrointestinal symptoms  * You develop any open sores, shingles, burning or painful urination   AND/OR:  * You no longer wish to have the surgery.  * Any other personal circumstances change that may lead to the need to cancel or defer this surgery.  *You were admitted to any hospital within one week of your planned procedure.    Seven/Six Days before Surgery:  Review your medication instructions, stop indicated medications    Day of Surgery:  Review your medication instructions, take indicated medications  Wear comfortable loose fitting clothing  Do not use moisturizers, creams, lotions or perfume  All jewelry and valuables should be left at home    Karrie Abdi [facility]  [facility]  [phone]  [phone]  Email-[email or web address]      Preoperative Brain Exercises    What are brain exercises?  A brain exercise is any activity that engages your thinking (cognitive) skills.    What types  of activities are considered brain exercises?  Jigsaw puzzles, crossword puzzles, word jumble, memory games, word search, and many more.  Many can be found free online or on your phone via a mobile amber.    Why should I do brain exercises before my surgery?  More recent research has shown brain exercise before surgery can lower the risk of postoperative delirium (confusion) which can be especially important for older adults.  Patients who did brain exercises for 5 to 10 hours the days before surgery, cut their risk of postoperative delirium in half up to 1 week after surgery.         [facility]    Preoperative Deep Breathing Exercises    Why it is important to do deep breathing exercises before my surgery?  Deep breathing exercises strengthen your breathing muscles.  This helps you to recover after your surgery and decreases the chance of breathing complications.      How are the deep breathing exercises done?  Sit straight with your back supported.  Breathe in deeply and slowly through your nose. Your lower rib cage should expand and your abdomen may move forward.  Hold that breath for 3 to 5 seconds.  Breathe out through pursed lips, slowly and completely.  Rest and repeat 10 times every hour while awake.  Rest longer if you become dizzy or lightheaded.         Patient and Family Education             Ways You Can Help Prevent Blood Clots             This handout explains some simple things you can do to help prevent blood clots.      Blood clots are blockages that can form in the body's veins. When a blood clot forms in your deep veins, it may be called a deep vein thrombosis, or DVT for short. Blood clots can happen in any part of the body where blood flows, but they are most common in the arms and legs. If a piece of a blood clot breaks free and travels to the lungs, it is called a pulmonary embolus (PE). A PE can be a very serious problem.         Being in the hospital or having  surgery can raise your chances of getting a blood clot because you may not be well enough to move around as much as you normally do.         Ways you can help prevent blood clots in the hospital         Wearing SCDs. SCDs stands for Sequential Compression Devices.   SCDs are special sleeves that wrap around your legs  They attach to a pump that fills them with air to gently squeeze your legs every few minutes.   This helps return the blood in your legs to your heart.   SCDs should only be taken off when walking or bathing.   SCDs may not be comfortable, but they can help save your life.               Wearing compression stockings - if your doctor orders them. These special snug fitting stockings gently squeeze your legs to help blood flow.       Walking. Walking helps move the blood in your legs.   If your doctor says it is ok, try walking the halls at least   5 times a day. Ask us to help you get up, so you don't fall.      Taking any blood thinning medicines your doctor orders.        Page 1 of 2     [facility]; 3/23   Ways you can help prevent blood clots at home       Wearing compression stockings - if your doctor orders them. ? Walking - to help move the blood in your legs.       Taking any blood thinning medicines your doctor orders.      Signs of a blood clot or PE      Tell your doctor or nurse know right away if you have of the problems listed below.    If you are at home, seek medical care right away. Call 911 for chest pain or problems breathing.               Signs of a blood clot (DVT) - such as pain,  swelling, redness or warmth in your arm or leg      Signs of a pulmonary embolism (PE) - such as chest     pain or feeling short of breath

## 2024-07-29 NOTE — PREPROCEDURE INSTRUCTIONS
Fasting Guidelines    Why must I stop eating and drinking near surgery time?  With sedation, food or liquid in your stomach can enter your lungs causing serious complications  Increases nausea and vomiting    When do I need to stop eating and drinking before my surgery?  Do not eat any food or drink any liquids after midnight the night before your surgery/procedure.  You may have sips of water to take medications.    Additional Instructions:     Avoid herbal supplements, multivitamins and NSAIDS (non-steroidal anti-inflammatory drugs) such as Advil, Aleve, Ibuprofen, Naproxen, Excedrin, Meloxicam or Celebrex for at least 7 days prior to surgery. May take Tylenol as needed.    Avoid tobacco and alcohol products for 24 hours prior to surgery.    CONTACT SURGEON'S OFFICE IF YOU DEVELOP:  * Fever = 100.4 F   * New respiratory symptoms (e.g. cough, shortness of breath, respiratory distress, sore throat)  * Recent loss of taste or smell  *Flu like symptoms such as headache, fatigue or gastrointestinal symptoms  * You develop any open sores, shingles, burning or painful urination   AND/OR:  * You no longer wish to have the surgery.  * Any other personal circumstances change that may lead to the need to cancel or defer this surgery.  *You were admitted to any hospital within one week of your planned procedure.    Seven/Six Days before Surgery:  Review your medication instructions, stop indicated medications    Day of Surgery:  Review your medication instructions, take indicated medications  Wear comfortable loose fitting clothing  Do not use moisturizers, creams, lotions or perfume  All jewelry and valuables should be left at home    Karrie Abdi [facility]  [facility]  [phone]  [phone]  Email-[email or web address]      Preoperative Brain Exercises    What are brain exercises?  A brain exercise is any activity that engages your thinking (cognitive) skills.    What types  of activities are considered brain exercises?  Jigsaw puzzles, crossword puzzles, word jumble, memory games, word search, and many more.  Many can be found free online or on your phone via a mobile amber.    Why should I do brain exercises before my surgery?  More recent research has shown brain exercise before surgery can lower the risk of postoperative delirium (confusion) which can be especially important for older adults.  Patients who did brain exercises for 5 to 10 hours the days before surgery, cut their risk of postoperative delirium in half up to 1 week after surgery.         [facility]    Preoperative Deep Breathing Exercises    Why it is important to do deep breathing exercises before my surgery?  Deep breathing exercises strengthen your breathing muscles.  This helps you to recover after your surgery and decreases the chance of breathing complications.      How are the deep breathing exercises done?  Sit straight with your back supported.  Breathe in deeply and slowly through your nose. Your lower rib cage should expand and your abdomen may move forward.  Hold that breath for 3 to 5 seconds.  Breathe out through pursed lips, slowly and completely.  Rest and repeat 10 times every hour while awake.  Rest longer if you become dizzy or lightheaded.         Patient and Family Education             Ways You Can Help Prevent Blood Clots             This handout explains some simple things you can do to help prevent blood clots.      Blood clots are blockages that can form in the body's veins. When a blood clot forms in your deep veins, it may be called a deep vein thrombosis, or DVT for short. Blood clots can happen in any part of the body where blood flows, but they are most common in the arms and legs. If a piece of a blood clot breaks free and travels to the lungs, it is called a pulmonary embolus (PE). A PE can be a very serious problem.         Being in the hospital or having  surgery can raise your chances of getting a blood clot because you may not be well enough to move around as much as you normally do.         Ways you can help prevent blood clots in the hospital         Wearing SCDs. SCDs stands for Sequential Compression Devices.   SCDs are special sleeves that wrap around your legs  They attach to a pump that fills them with air to gently squeeze your legs every few minutes.   This helps return the blood in your legs to your heart.   SCDs should only be taken off when walking or bathing.   SCDs may not be comfortable, but they can help save your life.               Wearing compression stockings - if your doctor orders them. These special snug fitting stockings gently squeeze your legs to help blood flow.       Walking. Walking helps move the blood in your legs.   If your doctor says it is ok, try walking the halls at least   5 times a day. Ask us to help you get up, so you don't fall.      Taking any blood thinning medicines your doctor orders.        Page 1 of 2     [facility]; 3/23   Ways you can help prevent blood clots at home       Wearing compression stockings - if your doctor orders them. ? Walking - to help move the blood in your legs.       Taking any blood thinning medicines your doctor orders.      Signs of a blood clot or PE      Tell your doctor or nurse know right away if you have of the problems listed below.    If you are at home, seek medical care right away. Call 911 for chest pain or problems breathing.               Signs of a blood clot (DVT) - such as pain,  swelling, redness or warmth in your arm or leg      Signs of a pulmonary embolism (PE) - such as chest     pain or feeling short of breath

## 2024-07-31 ENCOUNTER — ANESTHESIA EVENT (OUTPATIENT)
Dept: OPERATING ROOM | Facility: HOSPITAL | Age: 74
End: 2024-07-31
Payer: MEDICARE

## 2024-07-31 ASSESSMENT — ENCOUNTER SYMPTOMS
NECK NEGATIVE: 1
CARDIOVASCULAR NEGATIVE: 1
GASTROINTESTINAL NEGATIVE: 1
MYALGIAS: 1
ARTHRALGIAS: 1
RESPIRATORY NEGATIVE: 1
ENDOCRINE NEGATIVE: 1
EYES NEGATIVE: 1
NEUROLOGICAL NEGATIVE: 1
CONSTITUTIONAL NEGATIVE: 1

## 2024-08-01 ENCOUNTER — APPOINTMENT (OUTPATIENT)
Dept: CARDIOLOGY | Facility: HOSPITAL | Age: 74
End: 2024-08-01
Payer: MEDICARE

## 2024-08-01 ENCOUNTER — APPOINTMENT (OUTPATIENT)
Dept: RADIOLOGY | Facility: HOSPITAL | Age: 74
End: 2024-08-01
Payer: MEDICARE

## 2024-08-01 ENCOUNTER — HOSPITAL ENCOUNTER (OUTPATIENT)
Facility: HOSPITAL | Age: 74
LOS: 1 days | Discharge: HOME | End: 2024-08-02
Attending: DENTIST | Admitting: DENTIST
Payer: MEDICARE

## 2024-08-01 ENCOUNTER — ANESTHESIA (OUTPATIENT)
Dept: OPERATING ROOM | Facility: HOSPITAL | Age: 74
End: 2024-08-01
Payer: MEDICARE

## 2024-08-01 DIAGNOSIS — I48.11 LONGSTANDING PERSISTENT ATRIAL FIBRILLATION (MULTI): Primary | ICD-10-CM

## 2024-08-01 LAB
ALBUMIN SERPL BCP-MCNC: 3.4 G/DL (ref 3.4–5)
ANION GAP SERPL CALC-SCNC: 13 MMOL/L (ref 10–20)
BODY SURFACE AREA: 2.71 M2
BUN SERPL-MCNC: 25 MG/DL (ref 6–23)
CALCIUM SERPL-MCNC: 8.9 MG/DL (ref 8.6–10.6)
CHLORIDE SERPL-SCNC: 100 MMOL/L (ref 98–107)
CO2 SERPL-SCNC: 26 MMOL/L (ref 21–32)
CREAT SERPL-MCNC: 1.08 MG/DL (ref 0.5–1.3)
EGFRCR SERPLBLD CKD-EPI 2021: 72 ML/MIN/1.73M*2
ERYTHROCYTE [DISTWIDTH] IN BLOOD BY AUTOMATED COUNT: 16.7 % (ref 11.5–14.5)
GLUCOSE BLD MANUAL STRIP-MCNC: 148 MG/DL (ref 74–99)
GLUCOSE SERPL-MCNC: 162 MG/DL (ref 74–99)
HCT VFR BLD AUTO: 30 % (ref 41–52)
HGB BLD-MCNC: 9.1 G/DL (ref 13.5–17.5)
MCH RBC QN AUTO: 21.5 PG (ref 26–34)
MCHC RBC AUTO-ENTMCNC: 30.3 G/DL (ref 32–36)
MCV RBC AUTO: 71 FL (ref 80–100)
NRBC BLD-RTO: 0 /100 WBCS (ref 0–0)
PHOSPHATE SERPL-MCNC: 4.2 MG/DL (ref 2.5–4.9)
PLATELET # BLD AUTO: 215 X10*3/UL (ref 150–450)
POTASSIUM SERPL-SCNC: 4.1 MMOL/L (ref 3.5–5.3)
RBC # BLD AUTO: 4.24 X10*6/UL (ref 4.5–5.9)
SODIUM SERPL-SCNC: 135 MMOL/L (ref 136–145)
WBC # BLD AUTO: 10.8 X10*3/UL (ref 4.4–11.3)

## 2024-08-01 PROCEDURE — C1729 CATH, DRAINAGE: HCPCS | Performed by: DENTIST

## 2024-08-01 PROCEDURE — 93286 PERI-PX EVAL PM/LDLS PM IP: CPT

## 2024-08-01 PROCEDURE — 84100 ASSAY OF PHOSPHORUS: CPT | Performed by: STUDENT IN AN ORGANIZED HEALTH CARE EDUCATION/TRAINING PROGRAM

## 2024-08-01 PROCEDURE — 1200000002 HC GENERAL ROOM WITH TELEMETRY DAILY

## 2024-08-01 PROCEDURE — 99222 1ST HOSP IP/OBS MODERATE 55: CPT

## 2024-08-01 PROCEDURE — 3600000003 HC OR TIME - INITIAL BASE CHARGE - PROCEDURE LEVEL THREE: Performed by: DENTIST

## 2024-08-01 PROCEDURE — 2781000 HC OR 278 NO HCPCS: Performed by: DENTIST

## 2024-08-01 PROCEDURE — 7100000001 HC RECOVERY ROOM TIME - INITIAL BASE CHARGE: Performed by: DENTIST

## 2024-08-01 PROCEDURE — 82947 ASSAY GLUCOSE BLOOD QUANT: CPT

## 2024-08-01 PROCEDURE — 85027 COMPLETE CBC AUTOMATED: CPT | Performed by: STUDENT IN AN ORGANIZED HEALTH CARE EDUCATION/TRAINING PROGRAM

## 2024-08-01 PROCEDURE — 2720000007 HC OR 272 NO HCPCS: Performed by: DENTIST

## 2024-08-01 PROCEDURE — 36415 COLL VENOUS BLD VENIPUNCTURE: CPT | Performed by: STUDENT IN AN ORGANIZED HEALTH CARE EDUCATION/TRAINING PROGRAM

## 2024-08-01 PROCEDURE — C1713 ANCHOR/SCREW BN/BN,TIS/BN: HCPCS | Performed by: DENTIST

## 2024-08-01 PROCEDURE — 2500000001 HC RX 250 WO HCPCS SELF ADMINISTERED DRUGS (ALT 637 FOR MEDICARE OP): Performed by: STUDENT IN AN ORGANIZED HEALTH CARE EDUCATION/TRAINING PROGRAM

## 2024-08-01 PROCEDURE — 2780000003 HC OR 278 NO HCPCS: Performed by: DENTIST

## 2024-08-01 PROCEDURE — 70355 PANORAMIC X-RAY OF JAWS: CPT | Performed by: STUDENT IN AN ORGANIZED HEALTH CARE EDUCATION/TRAINING PROGRAM

## 2024-08-01 PROCEDURE — 2500000001 HC RX 250 WO HCPCS SELF ADMINISTERED DRUGS (ALT 637 FOR MEDICARE OP): Performed by: DENTIST

## 2024-08-01 PROCEDURE — 3700000002 HC GENERAL ANESTHESIA TIME - EACH INCREMENTAL 1 MINUTE: Performed by: DENTIST

## 2024-08-01 PROCEDURE — 2500000004 HC RX 250 GENERAL PHARMACY W/ HCPCS (ALT 636 FOR OP/ED): Performed by: ANESTHESIOLOGIST ASSISTANT

## 2024-08-01 PROCEDURE — 3700000001 HC GENERAL ANESTHESIA TIME - INITIAL BASE CHARGE: Performed by: DENTIST

## 2024-08-01 PROCEDURE — 7100000002 HC RECOVERY ROOM TIME - EACH INCREMENTAL 1 MINUTE: Performed by: DENTIST

## 2024-08-01 PROCEDURE — 2500000004 HC RX 250 GENERAL PHARMACY W/ HCPCS (ALT 636 FOR OP/ED): Performed by: STUDENT IN AN ORGANIZED HEALTH CARE EDUCATION/TRAINING PROGRAM

## 2024-08-01 PROCEDURE — 70355 PANORAMIC X-RAY OF JAWS: CPT

## 2024-08-01 PROCEDURE — 2500000005 HC RX 250 GENERAL PHARMACY W/O HCPCS: Performed by: DENTIST

## 2024-08-01 PROCEDURE — 2500000005 HC RX 250 GENERAL PHARMACY W/O HCPCS: Performed by: ANESTHESIOLOGIST ASSISTANT

## 2024-08-01 PROCEDURE — 3600000008 HC OR TIME - EACH INCREMENTAL 1 MINUTE - PROCEDURE LEVEL THREE: Performed by: DENTIST

## 2024-08-01 DEVICE — IMPLANTABLE DEVICE: Type: IMPLANTABLE DEVICE | Site: FACE | Status: FUNCTIONAL

## 2024-08-01 RX ORDER — HYDROMORPHONE HYDROCHLORIDE 1 MG/ML
0.2 INJECTION, SOLUTION INTRAMUSCULAR; INTRAVENOUS; SUBCUTANEOUS EVERY 5 MIN PRN
Status: DISCONTINUED | OUTPATIENT
Start: 2024-08-01 | End: 2024-08-01 | Stop reason: HOSPADM

## 2024-08-01 RX ORDER — ROCURONIUM BROMIDE 10 MG/ML
INJECTION, SOLUTION INTRAVENOUS AS NEEDED
Status: DISCONTINUED | OUTPATIENT
Start: 2024-08-01 | End: 2024-08-01

## 2024-08-01 RX ORDER — LEVOTHYROXINE SODIUM 25 UG/1
25 TABLET ORAL DAILY
COMMUNITY
Start: 2024-06-13

## 2024-08-01 RX ORDER — METOPROLOL TARTRATE 1 MG/ML
5 INJECTION, SOLUTION INTRAVENOUS ONCE
Status: DISCONTINUED | OUTPATIENT
Start: 2024-08-01 | End: 2024-08-01 | Stop reason: HOSPADM

## 2024-08-01 RX ORDER — SODIUM CHLORIDE, SODIUM LACTATE, POTASSIUM CHLORIDE, CALCIUM CHLORIDE 600; 310; 30; 20 MG/100ML; MG/100ML; MG/100ML; MG/100ML
INJECTION, SOLUTION INTRAVENOUS CONTINUOUS PRN
Status: DISCONTINUED | OUTPATIENT
Start: 2024-08-01 | End: 2024-08-01

## 2024-08-01 RX ORDER — PANTOPRAZOLE SODIUM 40 MG/1
40 TABLET, DELAYED RELEASE ORAL
Status: DISCONTINUED | OUTPATIENT
Start: 2024-08-02 | End: 2024-08-02 | Stop reason: HOSPADM

## 2024-08-01 RX ORDER — PROCHLORPERAZINE EDISYLATE 5 MG/ML
10 INJECTION INTRAMUSCULAR; INTRAVENOUS EVERY 6 HOURS PRN
Status: DISCONTINUED | OUTPATIENT
Start: 2024-08-01 | End: 2024-08-02 | Stop reason: HOSPADM

## 2024-08-01 RX ORDER — HYDROMORPHONE HYDROCHLORIDE 1 MG/ML
INJECTION, SOLUTION INTRAMUSCULAR; INTRAVENOUS; SUBCUTANEOUS AS NEEDED
Status: DISCONTINUED | OUTPATIENT
Start: 2024-08-01 | End: 2024-08-01

## 2024-08-01 RX ORDER — DEXTROSE 50 % IN WATER (D50W) INTRAVENOUS SYRINGE
12.5
Status: DISCONTINUED | OUTPATIENT
Start: 2024-08-01 | End: 2024-08-02 | Stop reason: HOSPADM

## 2024-08-01 RX ORDER — METHOCARBAMOL 100 MG/ML
1000 INJECTION, SOLUTION INTRAMUSCULAR; INTRAVENOUS ONCE
Status: DISCONTINUED | OUTPATIENT
Start: 2024-08-01 | End: 2024-08-01 | Stop reason: HOSPADM

## 2024-08-01 RX ORDER — FENTANYL CITRATE 50 UG/ML
INJECTION, SOLUTION INTRAMUSCULAR; INTRAVENOUS AS NEEDED
Status: DISCONTINUED | OUTPATIENT
Start: 2024-08-01 | End: 2024-08-01

## 2024-08-01 RX ORDER — SOTALOL HYDROCHLORIDE 120 MG/1
120 TABLET ORAL
Status: DISCONTINUED | OUTPATIENT
Start: 2024-08-01 | End: 2024-08-02 | Stop reason: HOSPADM

## 2024-08-01 RX ORDER — FAMOTIDINE 20 MG/1
40 TABLET, FILM COATED ORAL NIGHTLY
Status: DISCONTINUED | OUTPATIENT
Start: 2024-08-01 | End: 2024-08-02 | Stop reason: HOSPADM

## 2024-08-01 RX ORDER — ACETAMINOPHEN 160 MG/5ML
650 SOLUTION ORAL EVERY 6 HOURS
Status: DISCONTINUED | OUTPATIENT
Start: 2024-08-01 | End: 2024-08-02 | Stop reason: HOSPADM

## 2024-08-01 RX ORDER — ENOXAPARIN SODIUM 100 MG/ML
60 INJECTION SUBCUTANEOUS EVERY 12 HOURS SCHEDULED
Status: DISCONTINUED | OUTPATIENT
Start: 2024-08-01 | End: 2024-08-02 | Stop reason: HOSPADM

## 2024-08-01 RX ORDER — DEXTROSE 50 % IN WATER (D50W) INTRAVENOUS SYRINGE
25
Status: DISCONTINUED | OUTPATIENT
Start: 2024-08-01 | End: 2024-08-02 | Stop reason: HOSPADM

## 2024-08-01 RX ORDER — DULAGLUTIDE 3 MG/.5ML
3 INJECTION, SOLUTION SUBCUTANEOUS
COMMUNITY
Start: 2024-07-02

## 2024-08-01 RX ORDER — ATORVASTATIN CALCIUM 40 MG/1
40 TABLET, FILM COATED ORAL NIGHTLY
Status: DISCONTINUED | OUTPATIENT
Start: 2024-08-01 | End: 2024-08-02 | Stop reason: HOSPADM

## 2024-08-01 RX ORDER — LIDOCAINE HYDROCHLORIDE 10 MG/ML
0.1 INJECTION INFILTRATION; PERINEURAL ONCE
Status: DISCONTINUED | OUTPATIENT
Start: 2024-08-01 | End: 2024-08-01 | Stop reason: HOSPADM

## 2024-08-01 RX ORDER — INSULIN GLARGINE 300 U/ML
80 INJECTION, SOLUTION SUBCUTANEOUS EVERY MORNING
COMMUNITY
Start: 2024-05-31

## 2024-08-01 RX ORDER — OXYCODONE HYDROCHLORIDE 5 MG/1
2.5 TABLET ORAL EVERY 6 HOURS PRN
Status: DISCONTINUED | OUTPATIENT
Start: 2024-08-01 | End: 2024-08-02 | Stop reason: HOSPADM

## 2024-08-01 RX ORDER — ACETAMINOPHEN 325 MG/1
650 TABLET ORAL EVERY 4 HOURS PRN
Status: DISCONTINUED | OUTPATIENT
Start: 2024-08-01 | End: 2024-08-01 | Stop reason: HOSPADM

## 2024-08-01 RX ORDER — ONDANSETRON 4 MG/1
4 TABLET, FILM COATED ORAL EVERY 8 HOURS PRN
Status: DISCONTINUED | OUTPATIENT
Start: 2024-08-01 | End: 2024-08-02 | Stop reason: HOSPADM

## 2024-08-01 RX ORDER — INSULIN GLARGINE 100 [IU]/ML
40 INJECTION, SOLUTION SUBCUTANEOUS
Status: DISCONTINUED | OUTPATIENT
Start: 2024-08-02 | End: 2024-08-02 | Stop reason: HOSPADM

## 2024-08-01 RX ORDER — TRAMADOL HYDROCHLORIDE 50 MG/1
1 TABLET ORAL EVERY 6 HOURS
COMMUNITY
Start: 2024-07-15

## 2024-08-01 RX ORDER — ALBUTEROL SULFATE 0.83 MG/ML
2.5 SOLUTION RESPIRATORY (INHALATION) ONCE AS NEEDED
Status: DISCONTINUED | OUTPATIENT
Start: 2024-08-01 | End: 2024-08-01 | Stop reason: HOSPADM

## 2024-08-01 RX ORDER — OXYCODONE HYDROCHLORIDE 5 MG/1
10 TABLET ORAL EVERY 4 HOURS PRN
Status: DISCONTINUED | OUTPATIENT
Start: 2024-08-01 | End: 2024-08-01 | Stop reason: HOSPADM

## 2024-08-01 RX ORDER — OXYCODONE HYDROCHLORIDE 5 MG/1
5 TABLET ORAL EVERY 4 HOURS PRN
Status: DISCONTINUED | OUTPATIENT
Start: 2024-08-01 | End: 2024-08-02 | Stop reason: HOSPADM

## 2024-08-01 RX ORDER — SODIUM CHLORIDE 0.9 G/100ML
IRRIGANT IRRIGATION AS NEEDED
Status: DISCONTINUED | OUTPATIENT
Start: 2024-08-01 | End: 2024-08-01 | Stop reason: HOSPADM

## 2024-08-01 RX ORDER — SOTALOL HYDROCHLORIDE 120 MG/1
1 TABLET ORAL
COMMUNITY
Start: 2024-06-07

## 2024-08-01 RX ORDER — NALOXONE HYDROCHLORIDE 0.4 MG/ML
0.2 INJECTION, SOLUTION INTRAMUSCULAR; INTRAVENOUS; SUBCUTANEOUS EVERY 5 MIN PRN
Status: DISCONTINUED | OUTPATIENT
Start: 2024-08-01 | End: 2024-08-01 | Stop reason: SDUPTHER

## 2024-08-01 RX ORDER — LIDOCAINE HYDROCHLORIDE 20 MG/ML
INJECTION, SOLUTION INFILTRATION; PERINEURAL AS NEEDED
Status: DISCONTINUED | OUTPATIENT
Start: 2024-08-01 | End: 2024-08-01

## 2024-08-01 RX ORDER — ATORVASTATIN CALCIUM 40 MG/1
40 TABLET, FILM COATED ORAL NIGHTLY
COMMUNITY
Start: 2024-06-03

## 2024-08-01 RX ORDER — FUROSEMIDE 40 MG/1
2 TABLET ORAL
COMMUNITY
Start: 2024-07-09

## 2024-08-01 RX ORDER — FAMOTIDINE 40 MG/1
40 TABLET, FILM COATED ORAL NIGHTLY
COMMUNITY
Start: 2024-04-25

## 2024-08-01 RX ORDER — NALOXONE HYDROCHLORIDE 0.4 MG/ML
0.2 INJECTION, SOLUTION INTRAMUSCULAR; INTRAVENOUS; SUBCUTANEOUS EVERY 5 MIN PRN
Status: DISCONTINUED | OUTPATIENT
Start: 2024-08-01 | End: 2024-08-02 | Stop reason: HOSPADM

## 2024-08-01 RX ORDER — PROPOFOL 10 MG/ML
INJECTION, EMULSION INTRAVENOUS AS NEEDED
Status: DISCONTINUED | OUTPATIENT
Start: 2024-08-01 | End: 2024-08-01

## 2024-08-01 RX ORDER — GABAPENTIN 300 MG/1
1 CAPSULE ORAL
COMMUNITY
Start: 2024-06-12

## 2024-08-01 RX ORDER — PROCHLORPERAZINE MALEATE 10 MG
10 TABLET ORAL EVERY 6 HOURS PRN
Status: DISCONTINUED | OUTPATIENT
Start: 2024-08-01 | End: 2024-08-02 | Stop reason: HOSPADM

## 2024-08-01 RX ORDER — APIXABAN 2.5 MG/1
1 TABLET, FILM COATED ORAL
COMMUNITY
Start: 2024-07-05

## 2024-08-01 RX ORDER — PROCHLORPERAZINE 25 MG/1
25 SUPPOSITORY RECTAL EVERY 12 HOURS PRN
Status: DISCONTINUED | OUTPATIENT
Start: 2024-08-01 | End: 2024-08-02 | Stop reason: HOSPADM

## 2024-08-01 RX ORDER — HYDRALAZINE HYDROCHLORIDE 20 MG/ML
5 INJECTION INTRAMUSCULAR; INTRAVENOUS EVERY 30 MIN PRN
Status: DISCONTINUED | OUTPATIENT
Start: 2024-08-01 | End: 2024-08-01 | Stop reason: HOSPADM

## 2024-08-01 RX ORDER — OMEPRAZOLE 40 MG/1
1 CAPSULE, DELAYED RELEASE ORAL
COMMUNITY
Start: 2024-07-10

## 2024-08-01 RX ORDER — ACETAMINOPHEN 325 MG/1
650 TABLET ORAL EVERY 6 HOURS
Status: DISCONTINUED | OUTPATIENT
Start: 2024-08-01 | End: 2024-08-02 | Stop reason: HOSPADM

## 2024-08-01 RX ORDER — ONDANSETRON HYDROCHLORIDE 2 MG/ML
4 INJECTION, SOLUTION INTRAVENOUS EVERY 8 HOURS PRN
Status: DISCONTINUED | OUTPATIENT
Start: 2024-08-01 | End: 2024-08-02 | Stop reason: HOSPADM

## 2024-08-01 RX ORDER — GABAPENTIN 300 MG/1
300 CAPSULE ORAL
Status: DISCONTINUED | OUTPATIENT
Start: 2024-08-01 | End: 2024-08-02 | Stop reason: HOSPADM

## 2024-08-01 RX ORDER — ONDANSETRON HYDROCHLORIDE 2 MG/ML
INJECTION, SOLUTION INTRAVENOUS AS NEEDED
Status: DISCONTINUED | OUTPATIENT
Start: 2024-08-01 | End: 2024-08-01

## 2024-08-01 RX ORDER — OXYCODONE HYDROCHLORIDE 5 MG/1
5 TABLET ORAL EVERY 4 HOURS PRN
Status: DISCONTINUED | OUTPATIENT
Start: 2024-08-01 | End: 2024-08-01 | Stop reason: HOSPADM

## 2024-08-01 RX ORDER — CHLORHEXIDINE GLUCONATE ORAL RINSE 1.2 MG/ML
15 SOLUTION DENTAL 3 TIMES DAILY
Status: DISCONTINUED | OUTPATIENT
Start: 2024-08-01 | End: 2024-08-02 | Stop reason: HOSPADM

## 2024-08-01 RX ORDER — SODIUM CHLORIDE, SODIUM LACTATE, POTASSIUM CHLORIDE, CALCIUM CHLORIDE 600; 310; 30; 20 MG/100ML; MG/100ML; MG/100ML; MG/100ML
20 INJECTION, SOLUTION INTRAVENOUS CONTINUOUS
Status: DISCONTINUED | OUTPATIENT
Start: 2024-08-01 | End: 2024-08-01 | Stop reason: HOSPADM

## 2024-08-01 RX ORDER — HYDROMORPHONE HYDROCHLORIDE 1 MG/ML
0.5 INJECTION, SOLUTION INTRAMUSCULAR; INTRAVENOUS; SUBCUTANEOUS EVERY 5 MIN PRN
Status: DISCONTINUED | OUTPATIENT
Start: 2024-08-01 | End: 2024-08-01 | Stop reason: HOSPADM

## 2024-08-01 RX ORDER — CEFAZOLIN 1 G/1
INJECTION, POWDER, FOR SOLUTION INTRAVENOUS AS NEEDED
Status: DISCONTINUED | OUTPATIENT
Start: 2024-08-01 | End: 2024-08-01

## 2024-08-01 RX ORDER — LEVOTHYROXINE SODIUM 50 UG/1
25 TABLET ORAL DAILY
Status: DISCONTINUED | OUTPATIENT
Start: 2024-08-02 | End: 2024-08-02 | Stop reason: HOSPADM

## 2024-08-01 RX ORDER — ONDANSETRON HYDROCHLORIDE 2 MG/ML
4 INJECTION, SOLUTION INTRAVENOUS ONCE AS NEEDED
Status: DISCONTINUED | OUTPATIENT
Start: 2024-08-01 | End: 2024-08-01 | Stop reason: HOSPADM

## 2024-08-01 RX ORDER — BACITRACIN ZINC 500 UNIT/G
OINTMENT IN PACKET (EA) TOPICAL AS NEEDED
Status: DISCONTINUED | OUTPATIENT
Start: 2024-08-01 | End: 2024-08-01 | Stop reason: HOSPADM

## 2024-08-01 SDOH — SOCIAL STABILITY: SOCIAL INSECURITY: HAVE YOU HAD ANY THOUGHTS OF HARMING ANYONE ELSE?: NO

## 2024-08-01 SDOH — SOCIAL STABILITY: SOCIAL INSECURITY: DOES ANYONE TRY TO KEEP YOU FROM HAVING/CONTACTING OTHER FRIENDS OR DOING THINGS OUTSIDE YOUR HOME?: NO

## 2024-08-01 SDOH — SOCIAL STABILITY: SOCIAL INSECURITY: DO YOU FEEL UNSAFE GOING BACK TO THE PLACE WHERE YOU ARE LIVING?: NO

## 2024-08-01 SDOH — SOCIAL STABILITY: SOCIAL INSECURITY: HAVE YOU HAD THOUGHTS OF HARMING ANYONE ELSE?: YES

## 2024-08-01 SDOH — SOCIAL STABILITY: SOCIAL INSECURITY: HAS ANYONE EVER THREATENED TO HURT YOUR FAMILY OR YOUR PETS?: NO

## 2024-08-01 SDOH — SOCIAL STABILITY: SOCIAL INSECURITY: DO YOU FEEL ANYONE HAS EXPLOITED OR TAKEN ADVANTAGE OF YOU FINANCIALLY OR OF YOUR PERSONAL PROPERTY?: NO

## 2024-08-01 SDOH — SOCIAL STABILITY: SOCIAL INSECURITY: WERE YOU ABLE TO COMPLETE ALL THE BEHAVIORAL HEALTH SCREENINGS?: YES

## 2024-08-01 SDOH — SOCIAL STABILITY: SOCIAL INSECURITY: ARE THERE ANY APPARENT SIGNS OF INJURIES/BEHAVIORS THAT COULD BE RELATED TO ABUSE/NEGLECT?: NO

## 2024-08-01 SDOH — SOCIAL STABILITY: SOCIAL INSECURITY: ARE YOU OR HAVE YOU BEEN THREATENED OR ABUSED PHYSICALLY, EMOTIONALLY, OR SEXUALLY BY ANYONE?: NO

## 2024-08-01 SDOH — HEALTH STABILITY: MENTAL HEALTH: CURRENT SMOKER: 0

## 2024-08-01 SDOH — SOCIAL STABILITY: SOCIAL INSECURITY: ABUSE: ADULT

## 2024-08-01 ASSESSMENT — COGNITIVE AND FUNCTIONAL STATUS - GENERAL
PATIENT BASELINE BEDBOUND: NO
DAILY ACTIVITIY SCORE: 24
CLIMB 3 TO 5 STEPS WITH RAILING: A LITTLE
MOBILITY SCORE: 22
WALKING IN HOSPITAL ROOM: A LITTLE

## 2024-08-01 ASSESSMENT — PAIN SCALES - GENERAL
PAINLEVEL_OUTOF10: 4
PAINLEVEL_OUTOF10: 5 - MODERATE PAIN
PAINLEVEL_OUTOF10: 5 - MODERATE PAIN
PAINLEVEL_OUTOF10: 6
PAINLEVEL_OUTOF10: 5 - MODERATE PAIN
PAINLEVEL_OUTOF10: 7
PAINLEVEL_OUTOF10: 5 - MODERATE PAIN
PAINLEVEL_OUTOF10: 0 - NO PAIN
PAIN_LEVEL: 4

## 2024-08-01 ASSESSMENT — PAIN - FUNCTIONAL ASSESSMENT
PAIN_FUNCTIONAL_ASSESSMENT: 0-10
PAIN_FUNCTIONAL_ASSESSMENT: UNABLE TO SELF-REPORT
PAIN_FUNCTIONAL_ASSESSMENT: 0-10
PAIN_FUNCTIONAL_ASSESSMENT: UNABLE TO SELF-REPORT

## 2024-08-01 ASSESSMENT — ACTIVITIES OF DAILY LIVING (ADL)
HEARING - LEFT EAR: FUNCTIONAL
TOILETING: INDEPENDENT
JUDGMENT_ADEQUATE_SAFELY_COMPLETE_DAILY_ACTIVITIES: YES
FEEDING YOURSELF: INDEPENDENT
DRESSING YOURSELF: INDEPENDENT
PATIENT'S MEMORY ADEQUATE TO SAFELY COMPLETE DAILY ACTIVITIES?: YES
BATHING: INDEPENDENT
WALKS IN HOME: INDEPENDENT
HEARING - RIGHT EAR: FUNCTIONAL
ADEQUATE_TO_COMPLETE_ADL: YES
LACK_OF_TRANSPORTATION: NO
GROOMING: INDEPENDENT

## 2024-08-01 ASSESSMENT — LIFESTYLE VARIABLES
AUDIT-C TOTAL SCORE: 0
HOW OFTEN DO YOU HAVE A DRINK CONTAINING ALCOHOL: NEVER
AUDIT-C TOTAL SCORE: 0
SKIP TO QUESTIONS 9-10: 1
HOW MANY STANDARD DRINKS CONTAINING ALCOHOL DO YOU HAVE ON A TYPICAL DAY: PATIENT DOES NOT DRINK
HOW OFTEN DO YOU HAVE 6 OR MORE DRINKS ON ONE OCCASION: NEVER

## 2024-08-01 ASSESSMENT — COLUMBIA-SUICIDE SEVERITY RATING SCALE - C-SSRS
6. HAVE YOU EVER DONE ANYTHING, STARTED TO DO ANYTHING, OR PREPARED TO DO ANYTHING TO END YOUR LIFE?: NO
1. IN THE PAST MONTH, HAVE YOU WISHED YOU WERE DEAD OR WISHED YOU COULD GO TO SLEEP AND NOT WAKE UP?: NO
2. HAVE YOU ACTUALLY HAD ANY THOUGHTS OF KILLING YOURSELF?: NO

## 2024-08-01 ASSESSMENT — PATIENT HEALTH QUESTIONNAIRE - PHQ9
1. LITTLE INTEREST OR PLEASURE IN DOING THINGS: NOT AT ALL
SUM OF ALL RESPONSES TO PHQ9 QUESTIONS 1 & 2: 0
2. FEELING DOWN, DEPRESSED OR HOPELESS: NOT AT ALL

## 2024-08-01 NOTE — ANESTHESIA PREPROCEDURE EVALUATION
Patient: Masoud Barger    Procedure Information       Date/Time: 08/01/24 0715    Procedure: Open Reduction Internal Fixation Mandible (Bilateral)    Location: [facility] OR 06 / [facility] OR    Surgeons: Ruel Muller DDS            Relevant Problems   No relevant active problems       Clinical information reviewed:                   NPO Detail:  No data recorded     Physical Exam    Airway  Mallampati: III  TM distance: >3 FB     Cardiovascular   Rhythm: irregular  Comments: PPM DDDR . Pacemaker dependent.    Reprogrammed to VOO 80   Dental    Pulmonary    Abdominal          Anesthesia Plan    History of general anesthesia?: yes  History of complications of general anesthesia?: yes    ASA 3     general   (Nasal intubation. )  The patient is not a current smoker.  Patient was not previously instructed to abstain from smoking on day of procedure.  Patient did not smoke on day of procedure.  Education provided regarding risk of obstructive sleep apnea.  intravenous induction   Postoperative administration of opioids is intended.  Trial extubation is planned.  Anesthetic plan and risks discussed with patient.  Use of blood products discussed with patient who.    Plan discussed with CAA.

## 2024-08-01 NOTE — CONSULTS
Consults    Reason For Consult  Transfer to Medicine    History Of Present Illness  Masoud Barger is a 74 y.o. male presenting s/p bilateral mandible ORIF. He also reports constipation last week that needed to be resolved with a suppository. After receiving this, he reports explosive diarrhea, with instances where he cannot control his bowel movements. He states that this diarrhea has recently turned into mucous, and went to the ED and was found to be C. Diff negative.      Past Medical History  He has a past medical history of Arrhythmia, Asthma ([facility]), Coronary artery disease, GERD (gastroesophageal reflux disease), GI (gastrointestinal bleed), Heart valve disease, Hyperlipidemia, PONV (postoperative nausea and vomiting), Sleep apnea, Stroke (Multi), and Type 2 diabetes mellitus (Multi).    Surgical History  He has a past surgical history that includes Cardiac surgery; Coronary artery bypass graft; Tonsillectomy; Cholecystectomy; ORIF wrist fracture; and Orchiectomy.     Social History  He reports that he has quit smoking. His smoking use included cigarettes. He has never used smokeless tobacco. He reports that he does not currently use alcohol. He reports that he does not use drugs.    Family History  No family history on file.     Allergies  Shellfish containing products, Adhesive tape-silicones, Levaquin [levofloxacin], and Voltaren [diclofenac sodium]    Review of Systems  No nausea or vomiting  No shortness of breath   No chest pain or palpitations  Patient has had new onset diarrhea for the past week      Physical Exam  Respiratory: lungs clear to auscultation bilaterally, no wheezing or rhales  Cardiac: RRR, no murmurs or rubs  Extremities: bilateral 1+ pitting edema (patient reported baseline)    Last Recorded Vitals  /70   Pulse 66   Temp 36 °C (96.8 °F) (Temporal)   Resp 16   Wt (!) 155 kg (341 lb 7.9 oz)   SpO2 100%        Assessment/Plan   Masoud Barger is a 73 yo male with PMHx  significant for A-Fib s/p DC Cardioversion, asthma, diastolic CHF, CKD, complete heart block (medtronic pacer 3/2018), coronary artery disease s/p 3 vessel CABG (2/2018), HTN, HLD, hypothyroidism, sleep apnea, sick sinus syndrome, and T2DM, who is presenting s/p bilateral mandible ORIF.     Medicine was consulted for comorbidity management.     Patient has had recent doubts of diarrhea with a negative C. Diff result after an ER visit. This is most likely due to increased stool burden after previous constipation. Patient also has an elevated creatinine of 1.89 from his baseline of 1.4-1.5. This most likely due dehydration 2/2 recent diarrhea and poor oral fluid intake.     Recommend continuing soft diet, use CGM for glucose monitoring, check RFP, magnesium level, and CBC.     Co-morbidities  A-Fib  - continue aspirin 81 mg  - continue sotalol 120 BID   - patient took AM dose prior to surgery already  - Eliquis -> Surgical recommendation   CHF  - continue atorvastatin 40mg   - Hold lasix -> given recent diarrhea and elevated creatinine levels   - can resume with improved creatinine  T2DM  Patient took 40iu of Toujeo this AM  - hold trulicity  - continue 40iu Toujeo tomorrow AM  - continue with SSI while inpatient   - resume home regimen (80iu of Toujeo with Humalog SSI and Trulicity) with improved oral feeding    GERD  - continue famotidine 40mg at bedtime  - continue esomeprazole 40mg    Hypothyroidism  - continue levothyroxine 25mcg       Alan Porter DO

## 2024-08-01 NOTE — BRIEF OP NOTE
Date: 2024  OR Location: [facility] OR    Name: Masoud Barger, : 1950, Age: 74 y.o., MRN: [id number], Sex: male    Diagnosis  Pre-op Diagnosis      * Pathological fracture of mandible, initial encounter [M84.48XA]     * Fracture of unspecified part of body of left mandible, initial encounter for closed fracture (Multi) [S02.602A] Post-op Diagnosis     * Pathological fracture of mandible, initial encounter [M84.48XA]     * Fracture of unspecified part of body of left mandible, initial encounter for closed fracture (Multi) [S02.602A]     Procedures  01626 - Open reduction internal fixation of mandible fracture    Surgeons      * Ruel Muller - Primary    Resident/Fellow/Other Assistant:  Surgeons and Role:     * Ankush Lobato DMD, MD - Resident - Assisting     * Aneesh Rivers MD, DDS - Resident - Assisting     * Gerhard Dodd DMD - Resident - Assisting    Procedure Summary  Anesthesia: General  ASA: III  Anesthesia Staff: Anesthesiologist: Shaila Escobedo MD  C-AA: NENA Valencia  Estimated Blood Loss: 10mL  Intra-op Medications:   Administrations occurring from 0715 to 1035 on 24:   Medication Name Total Dose   sodium chloride 0.9 % irrigation solution 1,000 mL   bacitracin ointment 1 Application              Anesthesia Record               Intraprocedure I/O Totals          Output    Est. Blood Loss 10 mL    Total Output 10 mL          Specimen: No specimens collected     Staff:   Circulator: Rosi Christensenub Person: Heike          Findings: Mildly displaced fracture of the right angle/body of the mandible with small intraoral communication, no purulence or drainage through the intraoral wound.    Complications:  None; patient tolerated the procedure well.     Disposition: PACU - hemodynamically stable.  Condition: stable  Specimens Collected: No specimens collected  Attending Attestation:     Ruel Muller  Phone Number: [phone]

## 2024-08-01 NOTE — OP NOTE
Open Reduction Internal Fixation Mandible (B) Operative Note     Date: 2024  OR Location: [facility] OR    Name: Masoud Barger, : 1950, Age: 74 y.o., MRN: [id number], Sex: male    Diagnosis  Pre-op Diagnosis      * Pathological fracture of mandible, initial encounter [M84.48XA]     * Fracture of unspecified part of body of left mandible, initial encounter for closed fracture (Multi) [S02.602A] Post-op Diagnosis     * Pathological fracture of mandible, initial encounter [M84.48XA]     * Fracture of unspecified part of body of left mandible, initial encounter for closed fracture (Multi) [S02.602A]     Procedures   - Open reduction internal fixation of mandible fracture    Surgeons      * Ruel Muller - Primary    Resident/Fellow/Other Assistant:  Surgeons and Role:     * Ankush Lobato DMD, MD - Resident - Assisting     * Aneesh Rivers MD, DDS - Resident - Assisting     * Gerhard Dodd DMD - Resident - Assisting    Procedure Summary  Anesthesia: General  ASA: III  Anesthesia Staff: Anesthesiologist: Shaila Escobedo MD  C-AA: NENA Valencia  Estimated Blood Loss: 10mL  Intra-op Medications:   Administrations occurring from 0715 to 1035 on 24:   Medication Name Total Dose   sodium chloride 0.9 % irrigation solution 1,000 mL   bacitracin ointment 1 Application   HYDROmorphone (Dilaudid) injection 0.2 mg 0.2 mg              Anesthesia Record               Intraprocedure I/O Totals          Intake    LR infusion 700.00 mL    Total Intake 700 mL       Output    Est. Blood Loss 10 mL    Total Output 10 mL       Net    Net Volume 690 mL          Specimen: No specimens collected     Staff:   Circulator: Rosi Christensenub Person: Heike         Drains and/or Catheters:   Open Drain 1 Anterior;Right;Medial Neck (Active)       Implants:  Implants       Type Name Action Serial No.      Screw PLATE, TEMPLATE, FOR 20-HOLE STR PLATES 2.0 / 2.5 / 3.0 MM - SN/A - [id number] Implanted N/A     Screw 2.7 x 9.0  "mm Screw Maxdrive Implanted N/A     Screw Maxdrive 11 mm Implanted N/A     Screw MaxDrive 2.7 x 13 mm Implanted N/A     Screw MaxDrive TLTS-  MaxDrive 2.7 x 13 mm Wasted N/A              Findings: Moderately displaced fracture of right posterior mandibular body. Small intraoral opening of #32 extraction site.    Indications: Masoud Barger is an 74 y.o. male who is having surgery for fracture of right posterior mandibular body. He underwent extraction of carious tooth #32 on 7/9/24. On 7/22/24 he returned for follow up complaining of increasing pain after experiencing a popping sensation. Imaging at that time showed a minimally displaced fracture of the right posterior mandibular body through the previous extraction site. He is now indicated for open reduction and internal fixation of right mandibular fracture.     The patient was seen in the preoperative area. The risks, benefits, complications, treatment options, non-operative alternatives, expected recovery and outcomes were discussed with the patient. The possibilities of reaction to medication, pulmonary aspiration, injury to surrounding structures, bleeding, recurrent infection, the need for additional procedures, failure to diagnose a condition, and creating a complication requiring transfusion or operation were discussed with the patient. The patient concurred with the proposed plan, giving informed consent.  The site of surgery was properly noted/marked if necessary per policy. The patient has been actively warmed in preoperative area. Preoperative antibiotics have been ordered and given within 2 hours of incision. Venous thrombosis prophylaxis are not indicated.    Procedure Details:   The patient was greeted in the pre-op holding area, where all preoperative risks and complications were reviewed. Later, the patient was brought into the operating room by the anesthesia staff and was placed in the supine position. A \"time out\"  was performed to confirm " patient's identity and the procedure to be performed. The patient was then induced for general anesthesia and intubated with a nasal endotracheal tube. Following intubation, the nasal endotracheal tube was secured by the oral and maxillofacial surgery team. The patient was prepped and draped in standard oral and maxillofacial surgery fashion.    The inferior border and angle of the right mandible were marked and the site of incision was marked, 2cm inferior to the inferior border. At the right neck a 15 blade was used to incise skin. A bovie was used to control any bleeding vessels and proceed with dissection through subcutaneous tissue. The remaining subcutaneous fat was then bluntly dissected with a finger and open sponge to expose the platysma muscle. Confirmed with anesthesia that the patient was not paralyzed. The platymsa was bluntly dissected and sharply incised after using Checkpoint nerve stimulator to test for the presence of the marginal mandibular nerve. Dissection then continued in a similar fashion through the superficial layer of deep cervical fascia. Submandibular capsule was encountered and used to guide dissection through this plane toward the inferior border of the mandible. Facial vein and artery encountered, clamped, divided, and ligated with 2-0 silk suture. Once inferior border and angle of mandible palpated, monopolar electrocautery used to excise through the pterygomasseteric sling and expose the inferior border of the mandible. A periosteal elevator was then used to elevate periosteum off of mandible to expose proximal and distal extensions of the fracture. The fracture was mildly displaced. A #9 periosteal elevator was used to apply superior pressure on the distal segment, and the fracture reduced into an appropriate position with continuity at the inferior border. A 6 hole inferior border plate was then adapted to mandible, bicortical screw holes were drilled and plate was secured in  "place with appropriate length screws. The extraction socket from #32 was debrided with a curette and #9 periosteal and copiously irrigated. Healthy granulation tissue present at the base of the socket.    Attention was then turned intraorally. The intraoral wound was closed using 3-0 vicryl sutures in horizontal mattress and simple interrupted fashion. Tisseel then applied over the intraoral incision line. The neck wound was copiously irrigated with Irrisept and Floseal placed deep in the wound. The pterygomasseteric sling was reapproximated using 3-0 vicryl suture in simple interrupted fashion. A 1/4\" Penrose drain was then placed in the anterior extent of the incision and secured using 2-0 silk suture in horizontal mattress fashion. The platysma layer was closed using 3-0 vicryl suture in continuous fashion, and a deep dermal layer was closed using 4-0 vicryl suture in simple interrupted fashion. The skin was then closed with a 5-0 fast gut suture in continuous fashion. A thin layer of bacitracin was applied to the incision line and a Telfa island dressing placed. The patient's head was then wrapped with a Kerlix to function as a pressure dressing.    The oral cavity was suctioned and the throat pack removed. Care of the patient was then transferred over to the anesthesia team. The patient was emerged from anesthesia, extubated and was taken to PACU in stable condition.    Dr. Muller was present for all critical portions of the case.     Complications:  None; patient tolerated the procedure well.    Disposition: PACU - hemodynamically stable.  Condition: stable         Additional Details: N/A    Attending Attestation:     Ruel Muller  Phone Number: [phone]      "

## 2024-08-01 NOTE — ANESTHESIA POSTPROCEDURE EVALUATION
Patient: Masoud Barger    Procedure Summary       Date: 08/01/24 Room / Location: [facility] OR 06 / Virtual [facility]    Anesthesia Start: 0735 Anesthesia Stop: 1027    Procedure: Open Reduction Internal Fixation Mandible (Bilateral) Diagnosis:       Pathological fracture of mandible, initial encounter      Fracture of unspecified part of body of left mandible, initial encounter for closed fracture (Multi)      (Pathologic Fracture of Mandible [M84.48XA])      (Fracture unspecified part of Body of Mandible [S02.602XA])    Surgeons: Ruel Muller DDS Responsible Provider: Shaila Escobedo MD    Anesthesia Type: general ASA Status: 3            Anesthesia Type: general    Vitals Value Taken Time   /66 08/01/24 1045   Temp 36 °C (96.8 °F) 08/01/24 1030   Pulse 62 08/01/24 1051   Resp 3 08/01/24 1051   SpO2 100 % 08/01/24 1051   Vitals shown include unfiled device data.    Anesthesia Post Evaluation    Patient location during evaluation: PACU  Patient participation: complete - patient participated  Level of consciousness: awake and alert  Pain score: 4  Pain management: adequate  Multimodal analgesia pain management approach  Airway patency: patent  Cardiovascular status: acceptable  Respiratory status: acceptable  Hydration status: acceptable  Postoperative Nausea and Vomiting: none    No notable events documented.

## 2024-08-01 NOTE — ANESTHESIA PROCEDURE NOTES
Airway  Date/Time: 8/1/2024 7:53 AM  Urgency: elective    Airway not difficult    Staffing  Performed: CELIEN   Authorized by: Shaila Escobedo MD    Performed by: NENA Valencia  Patient location during procedure: OR    Indications and Patient Condition  Indications for airway management: anesthesia  Spontaneous Ventilation: absent  Sedation level: deep  Preoxygenated: yes  Patient position: sniffing  MILS maintained throughout  Mask difficulty assessment: 1 - vent by mask  Planned trial extubation    Final Airway Details  Final airway type: endotracheal airway      Successful airway: TYLER tube and ETT  Cuffed: yes   Successful intubation technique: video laryngoscopy  Facilitating devices/methods: Magill forceps  Endotracheal tube insertion site: right naris  Blade: Fang  Blade size: #4  ETT size (mm): 7.0  Cormack-Lehane Classification: grade I - full view of glottis  Placement verified by: chest auscultation and capnometry   Measured from: nares  Number of attempts at approach: 1    Additional Comments  Prewarmed Nasal TYLER tube, lubricated and prepped with Afrin  (oxymetazoline) and neosynephrine. Nares prepped preop with Afrin and at induction with nasal trumpets 32, 34 & 36 Fr, prior to intubation

## 2024-08-01 NOTE — SIGNIFICANT EVENT
S:   Masoud Barger is a 74 y.o. male presenting HOD 0 POD 0 s/p open reduction internal fixation of right mandible and placement of reconstruction plates and debridement of extraction site #32.     Patient reports moderate discomfort after procedure. Overall patient is feeling good.   O:   Extraoral incisions are CDI, intraoral incision CDI. Right neck dressing no strikethrough, and small serosanguinous output from penrose drain. CN V 3 mild hypoesthesia similar to pre-op, right CN 7 marginal mandibular branch minimally weak         Assessment and Plan:    Masoud Barger is a 74 year old male who is HOD 0 and POD 0 s/p repair or right mandible with reconstruction plate and debridement of #32.     Plan: Transition patient from clear fluids to full fluids. Patient is recovering appropriately in the postoperative period.     Neuro:  -Oxycodone 2.5 mg every 6hrs PRN   -Gabapentin 300 mg every 12 Hrs  -Acetaminophens  mg every 6 hrs       OMFS:  -Unasyn 3g in .9%NACL 200 ml/hr administered over 30 min every 6 hours.  -Chlorhexidine .12% TID    CV:  -Sotalol 120 mg every 12 hours   -Atorvastatin 40 mg PO nightly     GI:  -Full Liquid diet   -Pantoprazole 20 mg daily before breakfast  -Prochlorperazine 10 mg PO every 6hr PRN or 25 mg rectral PRN    -Famotidine 40 mg PO nightly   -Ondansetron 4 mg     Endo:  -Levothyroxine 25 mg  -Insulin glargine 40 unites subcutaneous before breakfast   -Insulin regular scaled     DVT ppx   -Enoxaparin 60 mg every 12 hours subcutaneous     Lines Drains  -Penrose drain. Right mandible     Dispo  -RNF (regular nursing floor)       Bertrand Spann   PGY-1   Pager [phone]

## 2024-08-01 NOTE — ANESTHESIA PROCEDURE NOTES
Peripheral IV  Date/Time: 8/1/2024 8:00 AM      Placement  Needle size: 18 G  Laterality: right  Location: forearm  Local anesthetic: none  Site prep: alcohol  Technique: anatomical landmarks  Attempts: 1

## 2024-08-01 NOTE — H&P
History Of Present Illness  Masoud Barger is a 74 y.o. male presenting with fracture of right mandibular angle 2 weeks s/p extraction of tooth #32. Patient presented to OMFS clinic 1 week ago with severe right mandibular pain and purulence from site #32, panoramic radiograph showed right mandible angle fracture that was not present one week prior. Patient states that he had been clenching and chewing that day but feels that the fracture happened overnight when he heard a loud pop and started having severe pain.     Medical History        Past Medical History:   Diagnosis Date    A-fib (Multi)       s/p DCCV in 2/2020    Anemia      Arthritis      Asthma (HHS-HCC)       related to allergies    Bradycardia      CHF (congestive heart failure) (Multi)       Chronic diastolic heart failure    Chronic pain disorder      CKD (chronic kidney disease)      Complete heart block (Multi)       s/p [employer] DC PPM placement in 3/2018,    Coronary artery disease       s/p 3 vessel CABG and mitral valve repair in 2/2018    ED (erectile dysfunction)      GERD (gastroesophageal reflux disease)      History of blood transfusion       after CABG in 2018    Hyperlipidemia      Hypertension      Hypothyroidism      Obesity      Palpitations      Peripheral neuropathy      Presence of cardiac pacemaker      Rectal bleeding       with anticoagulation in 2018    Sleep apnea       uses CPAP    SSS (sick sinus syndrome) (Multi)      Stroke (Multi) 2002    Testicular cancer (Multi)      Thyroid nodule      Type 2 diabetes mellitus (Multi)      Vision loss              Surgical History         Past Surgical History:   Procedure Laterality Date    CARDIAC CATHETERIZATION   04/14/2003    CARDIAC CATHETERIZATION   02/20/2018    CARDIAC CATHETERIZATION   09/30/2021    CARDIOVERSION   02/17/2020    CARDIOVERSION   01/29/2020    CHOLECYSTECTOMY   12/28/2006    CORONARY ARTERY BYPASS GRAFT   02/21/2018    HAND SURGERY        INSERT / REPLACE /  REMOVE PACEMAKER   03/01/2018    MITRAL VALVE REPAIR        ORCHIECTOMY SIMPLE / PARTIAL / RADICAL W/ SCROTAL / INGUINAL APPROACH             Patient Sexual activity questions deferred to the physician.     Family History          Family History   Problem Relation Name Age of Onset    Heart disease Mother        Hypertension Mother        Cancer Father        Hypertension Sister        Heart attack Brother        Hypertension Brother             Allergies[]Expand by Default         Allergies   Allergen Reactions    Levaquin [Levofloxacin] Other       Extreme leg pain    Shellfish Derived Nausea/vomiting    Voltaren [Diclofenac Sodium] Other       Elevated blood pressure    Adhesive Tape-Silicones Rash       Rash with silk tape                 Prior to Admission medications    Medication Sig Start Date End Date Taking? Authorizing Provider   amoxicillin (Amoxil) 500 mg capsule Take 1 capsule (500 mg) by mouth every 8 hours. until finished 7/9/24     Historical Provider, MD   aspirin 81 mg capsule Take 81 mg by mouth once daily.       Historical Provider, MD   atorvastatin (Lipitor) 40 mg tablet Take 1 tablet (40 mg) by mouth once daily at bedtime. 6/10/24     Historical Provider, MD   dulaglutide (Trulicity) 1.5 mg/0.5 mL pen injector injection Inject 1.5 mg under the skin 1 (one) time per week.       Historical Provider, MD   Eliquis 2.5 mg tablet Take 1 tablet (2.5 mg) by mouth 2 times a day.       Historical Provider, MD   esomeprazole (NexIUM) 40 mg DR capsule Take 1 capsule (40 mg) by mouth once daily. 8/25/22     Historical Provider, MD   famotidine (Pepcid) 40 mg tablet Take 1 tablet (40 mg) by mouth once daily at bedtime.       Historical Provider, MD   furosemide (Lasix) 40 mg tablet Take 2 tablets (80 mg) by mouth once daily. 10/9/23     Historical Provider, MD   gabapentin (Neurontin) 300 mg capsule Take 1 capsule (300 mg) by mouth 2 times a day. 6/3/24     Historical Provider, MD Zehra Johns 2-Pack 1  mg/0.2 mL auto-injector if needed. 4/24/24     Historical Provider, MD Kristen Gallardo Insulin 100 unit/mL injection Inject under the skin if needed.       Historical Provider, MD   HYDROcodone-acetaminophen (Norco) 5-325 mg tablet Take 1 tablet by mouth every 6 hours if needed.       Historical Provider, MD   levothyroxine (Synthroid, Levoxyl) 25 mcg tablet Take 1 tablet (25 mcg) by mouth once daily in the morning. Take before meals.       Historical Provider, MD   sotalol (Betapace) 120 mg tablet Take 1 tablet (120 mg) by mouth every 12 hours. 9/25/23     Historical Provider, MD Naomi Antunez U-300 Insulin 300 unit/mL (1.5 mL) injection INJECT 95 UNITS SUBCUTANEOUSLY IN THE MORNING       Historical Provider, MD      Review of Systems  Negative other than mentioned in HPI.     Physical Exam  Constitutional: AAOX3, resting comfortably in bed  NEURO: Alert and oriented x3, no gross motor or sensory deficits. Right CN V3 moderate paresthesia of mental area  CV: Regular rate, irregular rhythm  PULM: CTAB, breathing comfortably on RA  GI: Abd soft, nontender, nondistended, midline abdominal scar  Skin: Warm and dry. No rashes or lesions noted.  Eyes: PERRL, EOMI. clear sclera  ENMT: MMM, heavily worn maxillary denture in anterior and posterior crossbite, third molar site #32 not purulent, no swelling/drainage noted, neck supple with normal ROM, no previous scars noted, JALEEL >30mm  Extremities: SMITH, mild BLE edema, not pitting  PSYCH: Appropriate mood and behavior    Last Recorded Vitals  There were no vitals taken for this visit.    Relevant Results  Contains abnormal data COMPREHENSIVE METABOLIC PANEL  Order: [id number]  Component  Ref Range & Units 2 d ago   Protein, Total  6.3 - 8.0 g/dL 7.1   Albumin  3.9 - 4.9 g/dL 3.7 Low    Calcium, Total  8.5 - 10.2 mg/dL 9.0   Bilirubin, Total  0.2 - 1.3 mg/dL 0.7   Comment: Use of this assay is not recommended for patients undergoing treatment with eltrombopag due to the  potential for falsely elevated results.   Alkaline Phosphatase  38 - 113 U/L 72   AST  14 - 40 U/L 16   ALT  10 - 54 U/L 17   Glucose  74 - 99 mg/dL 131 High    Comment: The American Diabetes Association (ADA) provides guidance for cutoff values for fasting glucose and random glucose. The ADA defines fasting as no caloric intake for at least 8 hours. Fasting plasma glucose results between 100 to 125 mg/dL indicate increased risk for diabetes (prediabetes).  Fasting plasma glucose results greater than or equal to 126 mg/dL meet the criteria for diagnosis of diabetes. In the absence of unequivocal hyperglycemia, results should be confirmed by repeat testing. In a patient with classic symptoms of hyperglycemia or hyperglycemic crisis, random plasma glucose results greater than or equal to 200 mg/dL meet the criteria for diagnosis of diabetes.  Reference: Standards of Medical Care in Diabetes 2016, American Diabetes Association. Diabetes Care. 2016.39(Suppl 1).   BUN  9 - 24 mg/dL 33 High    Creatinine  0.73 - 1.22 mg/dL 1.89 High    Comment: Use of this assay is not recommended for patients undergoing treatment with phenindione, due to the potential for falsely depressed results.   Sodium  136 - 144 mmol/L 130 Low    Potassium  3.7 - 5.1 mmol/L 3.5 Low    Chloride  98 - 107 mmol/L 93 Low    CO2  22 - 30 mmol/L 30   Anion Gap  8 - 15 mmol/L 7 Low    Estimated Glomerular Filtration Rate  >=60 mL/min/1.73m² 37 Low    Comment: Estimated Glomerular Filtration Rate (eGFR) is calculated using the 2021 CKD-EPI creatinine equation. This equation utilizes serum creatinine, sex, and age as parameters. The creatinine assay has traceable calibration to isotope dilution-mass spectrometry. Refer to KDIGO guidelines for clinical interpretation. In patients with unstable renal function, e.g. those with acute kidney injury, the eGFR may not accurately reflect actual GFR.   [facility]     Specimen Collected:  07/30/24 15:57    Performed by: [facility] Last Resulted: 07/30/24 16:30   Received From: [facility]  Result Received: 07/31/24 14:01    View Encounter        Received Information         Assessment/Plan   Active Problems:  There are no active Hospital Problems.  Masoud Barger is a 74 y.o. male presenting with fracture of right mandibular angle 2 weeks s/p extraction of tooth #32, planned for open reduction internal fixation or right mandible. Discussed risks, benefits, alternatives with the patient including possibility of wiring him in mmf. Patient understood and consent was obtained.    Aneesh Rivers MD, DDS

## 2024-08-02 VITALS
OXYGEN SATURATION: 98 % | DIASTOLIC BLOOD PRESSURE: 67 MMHG | HEART RATE: 60 BPM | RESPIRATION RATE: 18 BRPM | SYSTOLIC BLOOD PRESSURE: 140 MMHG | HEIGHT: 70 IN | BODY MASS INDEX: 45.1 KG/M2 | WEIGHT: 315 LBS | TEMPERATURE: 97 F

## 2024-08-02 LAB
ANION GAP SERPL CALC-SCNC: 12 MMOL/L (ref 10–20)
BUN SERPL-MCNC: 25 MG/DL (ref 6–23)
CALCIUM SERPL-MCNC: 9.1 MG/DL (ref 8.6–10.6)
CHLORIDE SERPL-SCNC: 99 MMOL/L (ref 98–107)
CO2 SERPL-SCNC: 28 MMOL/L (ref 21–32)
CREAT SERPL-MCNC: 0.97 MG/DL (ref 0.5–1.3)
EGFRCR SERPLBLD CKD-EPI 2021: 82 ML/MIN/1.73M*2
ERYTHROCYTE [DISTWIDTH] IN BLOOD BY AUTOMATED COUNT: 16.7 % (ref 11.5–14.5)
GLUCOSE SERPL-MCNC: 190 MG/DL (ref 74–99)
HCT VFR BLD AUTO: 28.7 % (ref 41–52)
HGB BLD-MCNC: 8.7 G/DL (ref 13.5–17.5)
MAGNESIUM SERPL-MCNC: 1.82 MG/DL (ref 1.6–2.4)
MCH RBC QN AUTO: 21.9 PG (ref 26–34)
MCHC RBC AUTO-ENTMCNC: 30.3 G/DL (ref 32–36)
MCV RBC AUTO: 72 FL (ref 80–100)
NRBC BLD-RTO: 0 /100 WBCS (ref 0–0)
PLATELET # BLD AUTO: 199 X10*3/UL (ref 150–450)
POTASSIUM SERPL-SCNC: 4 MMOL/L (ref 3.5–5.3)
RBC # BLD AUTO: 3.98 X10*6/UL (ref 4.5–5.9)
SODIUM SERPL-SCNC: 135 MMOL/L (ref 136–145)
WBC # BLD AUTO: 11.7 X10*3/UL (ref 4.4–11.3)

## 2024-08-02 PROCEDURE — 99232 SBSQ HOSP IP/OBS MODERATE 35: CPT

## 2024-08-02 PROCEDURE — 80048 BASIC METABOLIC PNL TOTAL CA: CPT | Performed by: STUDENT IN AN ORGANIZED HEALTH CARE EDUCATION/TRAINING PROGRAM

## 2024-08-02 PROCEDURE — 7100000011 HC EXTENDED STAY RECOVERY HOURLY - NURSING UNIT

## 2024-08-02 PROCEDURE — 2500000004 HC RX 250 GENERAL PHARMACY W/ HCPCS (ALT 636 FOR OP/ED): Performed by: STUDENT IN AN ORGANIZED HEALTH CARE EDUCATION/TRAINING PROGRAM

## 2024-08-02 PROCEDURE — 83735 ASSAY OF MAGNESIUM: CPT | Performed by: STUDENT IN AN ORGANIZED HEALTH CARE EDUCATION/TRAINING PROGRAM

## 2024-08-02 PROCEDURE — 2500000001 HC RX 250 WO HCPCS SELF ADMINISTERED DRUGS (ALT 637 FOR MEDICARE OP): Performed by: STUDENT IN AN ORGANIZED HEALTH CARE EDUCATION/TRAINING PROGRAM

## 2024-08-02 PROCEDURE — 2500000002 HC RX 250 W HCPCS SELF ADMINISTERED DRUGS (ALT 637 FOR MEDICARE OP, ALT 636 FOR OP/ED)

## 2024-08-02 PROCEDURE — 36415 COLL VENOUS BLD VENIPUNCTURE: CPT | Performed by: STUDENT IN AN ORGANIZED HEALTH CARE EDUCATION/TRAINING PROGRAM

## 2024-08-02 PROCEDURE — 85027 COMPLETE CBC AUTOMATED: CPT | Performed by: STUDENT IN AN ORGANIZED HEALTH CARE EDUCATION/TRAINING PROGRAM

## 2024-08-02 RX ORDER — OXYCODONE HYDROCHLORIDE 5 MG/1
5 TABLET ORAL EVERY 4 HOURS PRN
Qty: 15 TABLET | Refills: 0 | Status: SHIPPED | OUTPATIENT
Start: 2024-08-02

## 2024-08-02 RX ORDER — INSULIN LISPRO 100 [IU]/ML
INJECTION, SOLUTION INTRAVENOUS; SUBCUTANEOUS
COMMUNITY
Start: 2024-04-29

## 2024-08-02 RX ORDER — MOMETASONE FUROATE MONOHYDRATE 50 UG/1
2 SPRAY, METERED NASAL DAILY
COMMUNITY

## 2024-08-02 RX ORDER — ACETAMINOPHEN 325 MG/1
650 TABLET ORAL EVERY 6 HOURS
Qty: 28 TABLET | Refills: 0 | Status: SHIPPED | OUTPATIENT
Start: 2024-08-02 | End: 2024-08-09

## 2024-08-02 RX ORDER — CHLORHEXIDINE GLUCONATE ORAL RINSE 1.2 MG/ML
15 SOLUTION DENTAL 3 TIMES DAILY
Qty: 473 ML | Refills: 0 | Status: SHIPPED | OUTPATIENT
Start: 2024-08-02

## 2024-08-02 RX ORDER — AMOXICILLIN AND CLAVULANATE POTASSIUM 875; 125 MG/1; MG/1
875 TABLET, FILM COATED ORAL 2 TIMES DAILY
Qty: 6 TABLET | Refills: 0 | Status: SHIPPED | OUTPATIENT
Start: 2024-08-02 | End: 2024-08-05

## 2024-08-02 ASSESSMENT — COGNITIVE AND FUNCTIONAL STATUS - GENERAL
CLIMB 3 TO 5 STEPS WITH RAILING: A LITTLE
DAILY ACTIVITIY SCORE: 24
WALKING IN HOSPITAL ROOM: A LITTLE
MOBILITY SCORE: 22

## 2024-08-02 ASSESSMENT — PAIN SCALES - GENERAL
PAINLEVEL_OUTOF10: 2
PAINLEVEL_OUTOF10: 2

## 2024-08-02 NOTE — PROGRESS NOTES
Subjective   Masoud Barger is a 74 y.o. male presenting s/p bilateral mandible ORIF. He also reports constipation last week that needed to be resolved with a suppository. After receiving this, he reports explosive diarrhea, with instances where he cannot control his bowel movements. He states that this diarrhea has recently turned into mucous, and went to the ED and was found to be C. Diff negative.     Today, he was social and excited to be discharged today. He is very knowledgeable about his blood sugar management and understands his home regimen and how to use his CGM. He also reported having a solid bowel movement this morning.        Objective     Last Recorded Vitals  /71 (BP Location: Left arm, Patient Position: Sitting)   Pulse 71   Temp 37.3 °C (99.1 °F) (Temporal)   Resp 18   Wt (!) 155 kg (342 lb 2.5 oz)   SpO2 97%   Intake/Output last 3 Shifts:    Intake/Output Summary (Last 24 hours) at 8/2/2024 1436  Last data filed at 8/2/2024 0500  Gross per 24 hour   Intake 1758.33 ml   Output 0 ml   Net 1758.33 ml       Admission Weight  Weight: (!) 155 kg (341 lb 7.9 oz) (08/01/24 0718)    Daily Weight  08/01/24 : (!) 155 kg (342 lb 2.5 oz)    Image Results  XR panorex  Narrative: Interpreted By:  Idris Guillen and Dulla Kireeti   STUDY:  XR PANOREX; ;  8/1/2024 10:49 pm      INDICATION:  Signs/Symptoms:Open reduction and fixation or right body fracture.      COMPARISON:  CT facial bones from 07/29/2024.      ACCESSION NUMBER(S):  [id number]      ORDERING CLINICIAN:  TY MCCLENDON      FINDINGS:  1 Panorex view      Status post ORIF of right mandible fracture. Soft tissue swelling is  present at the surgical site. Hardware does not show any  periprosthetic fracture or lucency to suggest displacement. There are  no maxillary teeth. Dental amalgam.      Impression: Interval ORIF of right mandible fracture with unremarkable hardware      I personally reviewed the images/study and I agree with the  findings  as stated by Annemarie Gonzalez MD, PGY-2 this study was interpreted at  [facility], [place], [place].      MACRO:  None      Signed by: Idris Guillen 8/2/2024 11:57 AM  Dictation workstation:   [id number]         Assessment/Plan      Masoud Barger is a 75 yo male with PMHx significant for A-Fib s/p DC Cardioversion, asthma, diastolic CHF, CKD, complete heart block (medtronic pacer 3/2018), coronary artery disease s/p 3 vessel CABG (2/2018), HTN, HLD, hypothyroidism, sleep apnea, sick sinus syndrome, and T2DM, who is presenting s/p bilateral mandible ORIF.      Medicine was consulted for comorbidity management.     Since surgery, patient has had a down-trending creatinine from 1.89 to 1.08 and most recently a .97. This is most likely due to improved oral hydration and holding his lasix 40 mg BID. He also reported having a solid bowel movement this morning. He understands his home insulin regimen well and knows how to use his CGM.     Patient is medically stable and does not need any changes at this time. Medicine is signing off.     Co-morbidities  A-Fib  - continue aspirin 81 mg  - continue home sotalol  - Eliquis -> Surgical recommendation     CHF  - continue atorvastatin 40mg   - Hold lasix and continue on discharge -> discussed with patient, may be good to switch to 20mg BID    T2DM  - resume home regimen (80iu of Toujeo with Humalog SSI and Trulicity) with improved oral feeding    GERD  - continue famotidine 40mg at bedtime  - continue esomeprazole 40mg     Hypothyroidism  - continue levothyroxine 25mcg     Alan Porter,

## 2024-08-02 NOTE — PROGRESS NOTES
Pharmacy Medication History Review    Masoud Barger is a 74 y.o. male admitted for Longstanding persistent atrial fibrillation (Multi). Pharmacy reviewed the patient's clwks-sl-iwpfljwfa medications and allergies for accuracy.    The list below reflects the updated PTA list. Comments regarding how patient may be taking medications differently can be found in the Admit Orders Activity  Prior to Admission Medications   Prescriptions Last Dose Informant   Eliquis 2.5 mg tablet Past Week Self   Sig: Take 1 tablet (2.5 mg) by mouth every 12 hours.   HumaLOG KwikPen Insulin 100 unit/mL injection  Self   Sig: INJECT 8 UNITS THREE TIMES DAILY WITH EACH MEAL FOR 3 DAYS WHEN YOU HAVE STEROID INJECTION PLUS USE THE SLIDING SCALE -MAX OF 10 UNITS   Toujeo SoloStar U-300 Insulin 300 unit/mL (1.5 mL) injection 7/31/2024 Self   Sig: Inject 80 Units under the skin once daily in the morning.   Trulicity 3 mg/0.5 mL pen injector Past Week Self   Sig: Inject 3 mg as directed 1 (one) time per week.   atorvastatin (Lipitor) 40 mg tablet 7/31/2024 Self   Sig: Take 1 tablet (40 mg) by mouth once daily at bedtime.   famotidine (Pepcid) 40 mg tablet 7/31/2024 Self   Sig: Take 1 tablet (40 mg) by mouth once daily at bedtime.   furosemide (Lasix) 40 mg tablet 7/31/2024 Self   Sig: Take 2 tablets (80 mg) by mouth early in the morning..   gabapentin (Neurontin) 300 mg capsule 7/31/2024 Self   Sig: Take 1 capsule (300 mg) by mouth every 12 hours.   levothyroxine (Synthroid, Levoxyl) 25 mcg tablet 7/31/2024 Self   Sig: Take 1 tablet (25 mcg) by mouth early in the morning..   mometasone (Nasonex) 50 mcg/actuation nasal spray  Self   Sig: Administer 2 sprays into each nostril once daily.   omeprazole (PriLOSEC) 40 mg DR capsule 7/31/2024 Self   Sig: Take 1 capsule (40 mg) by mouth early in the morning..   sotalol (Betapace) 120 mg tablet 7/31/2024 Self   Sig: Take 1 tablet (120 mg) by mouth every 12 hours.   traMADol (Ultram) 50 mg tablet Past  Week Self   Sig: Take 1 tablet (50 mg) by mouth every 6 hours.      Facility-Administered Medications: None        The list below reflects the updated allergy list. Please review each documented allergy for additional clarification and justification.  Allergies  Reviewed by Lisa Alvarado RN on 8/1/2024        Severity Reactions Comments    Shellfish Containing Products High Anaphylaxis Swelling throat    Adhesive Tape-silicones Not Specified Hives SILK tape    Levaquin [levofloxacin] Not Specified Other Extreme leg pain    Voltaren [diclofenac Sodium] Not Specified Other Elevated BP            Patient accepts M2B at discharge. Pharmacy has been updated to [facility].    Sources used to confirm home medication list include: Patient interview, [facility]RS, Care Everywhere, medication fill history, [facility] 6/3/24.    Medications added: Humalog, Mometasone    Medications modified: None    Medications to be removed: None    Below are additional concerns with the patient's PTA list.  None to note    Mary Cosby [facility]   Transitions of Care Pharmacist  [facility]  Please reach out via Secure Chat for questions, or if no response call [facility] [employer] or [facility]

## 2024-08-02 NOTE — DISCHARGE INSTRUCTIONS
"MEDICATIONS  ° Pain medication should be taken only during the time that you feel significant discomfort. If the pain is severe, the prescription medication can be used. However, if only mild discomfort is experienced, try to use a less potent, over the counter medication such as Advil (ibuprofen and/or Tylenol (Acetaminophen). These can be taken in crushed tablets or in liquid form.  ° Antibiotics: This medicine should be taken at the appropriate interval as described on the bottle. Be sure not to miss any doses and take the medicine until it is gone.    WOUND CARE  ° We recommend using ice packs on your face for the first 48 hours to reduce swelling and bruising over the affected areas. We recommend 20 minutes on and 20 minutes off. Make sure to wrap the ice pack in a towel - do not apply directly to skin.  Cold or heat directly on numb areas can lead to permanent damage of the skin.  ° Keep your head elevated, at least 30 degrees, to minimize swelling. This may require you to sleep in a reclining chair or using several pillows in bed.  ° Do not shower if you have a dressing on. Once the dressing is removed, keep the wound clean and dry for five days. On the 6th day, you may get it wet but no direct streams of water (for example, in the shower) and be careful to pat dry and not rub the wound.   ° If you have sutures, you may clean the sutures only with a cotton swab and a solution of ½ hydrogen peroxide and ½ water. We highly recommend this as the sutures dry out and become \"crusty\" with time.  ° Keep a thin coating of antibiotic ointment or Vaseline on the surgical sites (Polysporin, Bacitracin).     PHYSICAL ACTIVITY  ° Following surgery you will find that your energy level is much lower. This will take some time to return to normal.  ° Although you just had surgery, it is important that you do not stay in bed while awake to minimize the chances of leg clots. We highly encourage occasional standing and walking " as tolerated.  ° Try to transfer to a chair instead and walk throughout the house as much as you can tolerate. Try to be as active as possible. The swelling will worsen over the next 3-4 days after surgery and is expected. The swelling will begin to subside over the next few days.  ° Physical exercise such as walking or running can begin 2 or 3 weeks after surgery.  ° When you attempt to return to normal physical activity start slowly and work up to your normal level.  ° It is important that you take deep breaths after surgery to help prevent development of pneumonia. Try to take 10-20 deep breaths every hour while you are awake. Encourage yourself to gently cough if you feel mucous accumulating in the back of your throat or lungs.     PLEASE REPORT ANY OF THE FOLLOWING TO OUR TEAM:  ° Sudden or excessive bleeding, swelling, or bruising.  ° Any itching, rash, or adverse reaction to medication.  ° Fever over 100 degrees Fahrenheit.  ° Drainage or discharge from the incision sites (other than blood).  ° Any injury to the face over the next 6 weeks.    If you have any questions or concerns please contact us during regular office hours ([phone]).  For any emergency or after hours questions do not hesitate to contact the resident on call. You may call [phone] for the hospital  and ask for the ``Oral Surgeon On Call´´.     Please follow up with us in our outpatient clinic on 8/9/2024 @ 9:30 AM.  Department of Oral & Maxillofacial Surgery  [address], [address]st [address] ([facility])  [place] [address]    Office phone number: [phone].  Office fax number: [phone].  Team Pager: [phone].  Patients can contact the [id number] through the hosptial  [phone].   Gisela Cunningham  [facility] PGY-1  Pager:[phone]

## 2024-08-02 NOTE — CARE PLAN
Problem: Skin  Goal: Decreased wound size/increased tissue granulation at next dressing change  Outcome: Progressing  Goal: Participates in plan/prevention/treatment measures  Outcome: Progressing  Goal: Prevent/manage excess moisture  Outcome: Progressing  Goal: Prevent/minimize sheer/friction injuries  Outcome: Progressing  Goal: Promote/optimize nutrition  Outcome: Progressing  Goal: Promote skin healing  Outcome: Progressing     Problem: Fall/Injury  Goal: Not fall by end of shift  Outcome: Progressing  Goal: Be free from injury by end of the shift  Outcome: Progressing  Goal: Verbalize understanding of personal risk factors for fall in the hospital  Outcome: Progressing  Goal: Verbalize understanding of risk factor reduction measures to prevent injury from fall in the home  Outcome: Progressing  Goal: Use assistive devices by end of the shift  Outcome: Progressing  Goal: Pace activities to prevent fatigue by end of the shift  Outcome: Progressing     Problem: Pain  Goal: Takes deep breaths with improved pain control throughout the shift  Outcome: Progressing  Goal: Turns in bed with improved pain control throughout the shift  Outcome: Progressing  Goal: Walks with improved pain control throughout the shift  Outcome: Progressing  Goal: Performs ADL's with improved pain control throughout shift  Outcome: Progressing  Goal: Participates in PT with improved pain control throughout the shift  Outcome: Progressing  Goal: Free from opioid side effects throughout the shift  Outcome: Progressing  Goal: Free from acute confusion related to pain meds throughout the shift  Outcome: Progressing     Problem: Infection related to problem list condition  Goal: Infection will resolve through treatment  Outcome: Progressing     Problem: Respiratory  Goal: Clear secretions with interventions this shift  Outcome: Progressing  Goal: Minimize anxiety/maximize coping throughout shift  Outcome: Progressing  Goal: Minimal/no  exertional discomfort or dyspnea this shift  Outcome: Progressing  Goal: No signs of respiratory distress (eg. Use of accessory muscles. Peds grunting)  Outcome: Progressing  Goal: Patent airway maintained this shift  Outcome: Progressing  Goal: Tolerate mechanical ventilation evidenced by VS/agitation level this shift  Outcome: Progressing  Goal: Tolerate pulmonary toileting this shift  Outcome: Progressing  Goal: Verbalize decreased shortness of breath this shift  Outcome: Progressing  Goal: Wean oxygen to maintain O2 saturation per order/standard this shift  Outcome: Progressing  Goal: Increase self care and/or family involvement in next 24 hours  Outcome: Progressing   The patient's goals for the shift include      The clinical goals for the shift include

## 2024-08-02 NOTE — CARE PLAN
The patient's goals for the shift include      The clinical goals for the shift include pain management and promote comfort      Problem: Skin  Goal: Decreased wound size/increased tissue granulation at next dressing change  Outcome: Progressing  Goal: Participates in plan/prevention/treatment measures  Outcome: Progressing  Goal: Prevent/manage excess moisture  Outcome: Progressing  Goal: Prevent/minimize sheer/friction injuries  Outcome: Progressing  Goal: Promote/optimize nutrition  Outcome: Progressing  Goal: Promote skin healing  Outcome: Progressing     Problem: Fall/Injury  Goal: Not fall by end of shift  Outcome: Progressing  Goal: Be free from injury by end of the shift  Outcome: Progressing  Goal: Verbalize understanding of personal risk factors for fall in the hospital  Outcome: Progressing  Goal: Verbalize understanding of risk factor reduction measures to prevent injury from fall in the home  Outcome: Progressing  Goal: Use assistive devices by end of the shift  Outcome: Progressing  Goal: Pace activities to prevent fatigue by end of the shift  Outcome: Progressing     Problem: Pain  Goal: Takes deep breaths with improved pain control throughout the shift  Outcome: Progressing  Goal: Turns in bed with improved pain control throughout the shift  Outcome: Progressing  Goal: Walks with improved pain control throughout the shift  Outcome: Progressing  Goal: Performs ADL's with improved pain control throughout shift  Outcome: Progressing  Goal: Participates in PT with improved pain control throughout the shift  Outcome: Progressing  Goal: Free from opioid side effects throughout the shift  Outcome: Progressing  Goal: Free from acute confusion related to pain meds throughout the shift  Outcome: Progressing     Problem: Infection related to problem list condition  Goal: Infection will resolve through treatment  Outcome: Progressing     Problem: Respiratory  Goal: Clear secretions with interventions this  shift  Outcome: Progressing  Goal: Minimize anxiety/maximize coping throughout shift  Outcome: Progressing  Goal: Minimal/no exertional discomfort or dyspnea this shift  Outcome: Progressing  Goal: No signs of respiratory distress (eg. Use of accessory muscles. Peds grunting)  Outcome: Progressing  Goal: Patent airway maintained this shift  Outcome: Progressing  Goal: Tolerate mechanical ventilation evidenced by VS/agitation level this shift  Outcome: Progressing  Goal: Tolerate pulmonary toileting this shift  Outcome: Progressing  Goal: Verbalize decreased shortness of breath this shift  Outcome: Progressing  Goal: Wean oxygen to maintain O2 saturation per order/standard this shift  Outcome: Progressing  Goal: Increase self care and/or family involvement in next 24 hours  Outcome: Progressing

## 2024-08-02 NOTE — DISCHARGE SUMMARY
Discharge Diagnosis  Patient is being discharged after undergoing a surgery for open reduction internal fixation of mandible and placement of reconstruction plates and debridement of extraction site #32 s/p pathological fracture of the right mandible.    Issues Requiring Follow-Up  Post surgery follow up.    Test Results Pending At Discharge  Pending Labs       No current pending labs.            Hospital Course   Masoud Barger is a 74 y.o. male presenting HOD 2 POD 1 s/p open reduction internal fixation of right mandible and placement of reconstruction plates and debridement of extraction site #32. Patient reports adequate sleep overight. Pain control is well managed. Patient reports a pain of 2/10. Patient reports oral intake of liquid diet and fluids. Patient reports normal urination with no issues. Patient has flatus. Patient has diarrhea of mucoid consistency, similar to his lead up to the procedure. Patient reports normal ambulatory function.        Pertinent Physical Exam At Time of Discharge  Physical Exam  Constitutional:       Appearance: Normal appearance.   HENT:      Head:      Comments: Right facial swelling concentrated near right mandibular angle and ramus  Cardiovascular:      Comments: Pt is warm and well-perfused  Pulmonary:      Comments: Pt is breathing comfortably on room air  Abdominal:      General: Abdomen is flat.      Palpations: Abdomen is soft.   Skin:     General: Skin is warm and dry.   Neurological:      Mental Status: He is alert.      Comments: CN V intact b/l  Minimal CN V3 hypoesthesia on the right side   Psychiatric:         Mood and Affect: Mood normal.         Behavior: Behavior normal.       Home Medications     Medication List      START taking these medications     acetaminophen 325 mg tablet; Commonly known as: Tylenol; Take 2 tablets   (650 mg) by mouth every 6 hours for 7 days.   amoxicillin-pot clavulanate 875-125 mg tablet; Commonly known as:   Augmentin; Take 1  tablet (875 mg) by mouth 2 times a day for 3 days.   chlorhexidine 0.12 % solution; Commonly known as: Peridex; Use 15 mL in   the mouth or throat 3 times a day.   oxyCODONE 5 mg immediate release tablet; Commonly known as: Roxicodone;   Take 1 tablet (5 mg) by mouth every 4 hours if needed for severe pain (7 -   10).     CONTINUE taking these medications     atorvastatin 40 mg tablet; Commonly known as: Lipitor   Eliquis 2.5 mg tablet; Generic drug: apixaban   famotidine 40 mg tablet; Commonly known as: Pepcid   furosemide 40 mg tablet; Commonly known as: Lasix   gabapentin 300 mg capsule; Commonly known as: Neurontin   HumaLOG KwikPen Insulin 100 unit/mL injection; Generic drug: insulin   lispro   levothyroxine 25 mcg tablet; Commonly known as: Synthroid, Levoxyl   mometasone 50 mcg/actuation nasal spray; Commonly known as: Nasonex   omeprazole 40 mg DR capsule; Commonly known as: PriLOSEC   sotalol 120 mg tablet; Commonly known as: Betapace   Toujeo SoloStar U-300 Insulin 300 unit/mL (1.5 mL) injection; Generic   drug: insulin glargine   traMADol 50 mg tablet; Commonly known as: Ultram   Trulicity 3 mg/0.5 mL pen injector; Generic drug: dulaglutide       Outpatient Follow-Up    Patient is to be seen for a follow up in our outpatient clinic on 8/9/24 @ 9:30 AM    Department of Oral & Maxillofacial Surgery  [address] [place] [address], 1st Floor ([facility])  [place] [address]    Office phone number: [phone].  Office fax number: [phone].  Team Pager: [phone].  Patients can contact the [id number] through the hosptial  [phone].     Gisela Cunningham DDS  [facility] PGY-1  Pgaer:[id number]

## 2024-08-02 NOTE — PROGRESS NOTES
"Masoud Barger is a 74 y.o. male presenting HOD 2 POD 1 s/p open reduction internal fixation of right mandible and placement of reconstruction plates and debridement of extraction site #32.     Subjective   Patient reports adequate sleep overight. Pain control is well managed. Patient reports a pain of 2/10. Patient reports oral intake of liquid diet and fluids. Patient reports normal urination with no issues. Patient has flatus. Patient has diarrhea of mucoid consistency, similar to his lead up to the procedure. Patient reports normal ambulatory function.        Objective     Physical Exam  Constitutional:       General: He is not in acute distress.  HENT:      Head: Normocephalic and atraumatic.      Comments: Intra Oral and Extra Oral incisions CDI - no signs of inflammation, swelling, and bleeding  Right Cranial Nerve V3 slight hypoesthesia - otherwise Cranial Nerve V and VII equal and intact b/l  Right facial swelling concentrated near right mandibular angle and ramus  Penrose drain intact in right submandibular region - no drainage  Telfa island dressing - no strikethrough       Mouth/Throat:      Mouth: Mucous membranes are moist.      Comments: Intra oral incision intact    Eyes:      Pupils: Pupils are equal, round, and reactive to light.   Musculoskeletal:      Comments: Moving all extremities  SCDs in place   Neurological:      Mental Status: He is alert and oriented to person, place, and time.   Psychiatric:         Mood and Affect: Mood normal.         Behavior: Behavior normal.         Last Recorded Vitals  Blood pressure 144/76, pulse 91, temperature 36.5 °C (97.7 °F), temperature source Temporal, resp. rate 18, height 1.778 m (5' 10\"), weight (!) 155 kg (342 lb 2.5 oz), SpO2 94%.  Intake/Output last 3 Shifts:  I/O last 3 completed shifts:  In: 1600 (10.3 mL/kg) [I.V.:1600 (10.3 mL/kg)]  Out: 260 (1.7 mL/kg) [Urine:250 (0 mL/kg/hr); Blood:10]  Weight: 154.9 kg     Relevant Results      Results for " orders placed or performed during the hospital encounter of 08/01/24 (from the past 24 hour(s))   Cardiac device check - Surgery   Result Value Ref Range    BSA 2.71 m2   POCT GLUCOSE   Result Value Ref Range    POCT Glucose 148 (H) 74 - 99 mg/dL   CBC   Result Value Ref Range    WBC 10.8 4.4 - 11.3 x10*3/uL    nRBC 0.0 0.0 - 0.0 /100 WBCs    RBC 4.24 (L) 4.50 - 5.90 x10*6/uL    Hemoglobin 9.1 (L) 13.5 - 17.5 g/dL    Hematocrit 30.0 (L) 41.0 - 52.0 %    MCV 71 (L) 80 - 100 fL    MCH 21.5 (L) 26.0 - 34.0 pg    MCHC 30.3 (L) 32.0 - 36.0 g/dL    RDW 16.7 (H) 11.5 - 14.5 %    Platelets 215 150 - 450 x10*3/uL   Renal function panel   Result Value Ref Range    Glucose 162 (H) 74 - 99 mg/dL    Sodium 135 (L) 136 - 145 mmol/L    Potassium 4.1 3.5 - 5.3 mmol/L    Chloride 100 98 - 107 mmol/L    Bicarbonate 26 21 - 32 mmol/L    Anion Gap 13 10 - 20 mmol/L    Urea Nitrogen 25 (H) 6 - 23 mg/dL    Creatinine 1.08 0.50 - 1.30 mg/dL    eGFR 72 >60 mL/min/1.73m*2    Calcium 8.9 8.6 - 10.6 mg/dL    Phosphorus 4.2 2.5 - 4.9 mg/dL    Albumin 3.4 3.4 - 5.0 g/dL          Assessment/Plan     Masoud Barger is a 74 year old male who is HOD 2 and POD 1 s/p repair of right mandible with reconstruction plate and debridement of #32. Patient is recovering appropriately with adequate pain control, CDI incisions, ambulating, with regular bowel movements and flatus. Plan is to discharge patient later today and penrose drain removal from right mandible.      Plan     Neuro:  -Oxycodone 2.5 mg every 6hrs PRN   -Gabapentin 300 mg every 12 Hrs  -Acetaminophen  mg every 6 hrs      OMFS:  -Unasyn 3g every 6 hours.  -Chlorhexidine .12% TID     CV:  Medicine consulted, appreciate recs  A-Fib  - continue aspirin 81 mg  - continue sotalol 120 BID  CHF  - continue atorvastatin 40mg   - Hold lasix -> given recent diarrhea and elevated creatinine levels   - Plan to resume upon discharge  GI:  -Full Liquid diet   -Pantoprazole 20 mg daily before  breakfast  -Prochlorperazine 10 mg PO every 6hr PRN or 25 mg rectral PRN    -Famotidine 40 mg PO nightly   -Ondansetron 4 mg PRN  -Continue esomeprazole 40mg     Endo:  -continue Levothyroxine 25 mg  -Insulin glargine 40 units subcutaneous before breakfast   -Sliding scale insulin while inpatient  - Hold trulicity, plan to restart upon discharge     DVT ppx   -Enoxaparin 60 mg every 12 hours subcutaneous   - SCD's     Lines Drains  -Penrose drain. Right mandible, anticipated removal today (8/2) in the PM     Dispo  -Continue on T9, anticipate discharge today in the PM      Gerhard Dodd, DMD

## 2024-08-03 NOTE — SIGNIFICANT EVENT
Patient seen at bedside prior to discharge to remove penrose drain at right mandible. There was minimal output on the neck dressing. 2-0 silk suture in the penrose was cut and removed in two pieces. The penrose was subsequently removed and the site was dressed with a 2x2 gauze and secured with tape. Patient tolerated the drain removal well. All questions and concerns were dealt with.

## 2024-09-27 DIAGNOSIS — G89.18 POST-OP PAIN: Primary | ICD-10-CM

## 2024-09-27 RX ORDER — TRAMADOL HYDROCHLORIDE 50 MG/1
50 TABLET ORAL EVERY 8 HOURS PRN
Qty: 10 TABLET | Refills: 0 | Status: SHIPPED | OUTPATIENT
Start: 2024-09-27 | End: 2024-10-02

## 2025-05-12 ENCOUNTER — APPOINTMENT (OUTPATIENT)
Dept: RADIOLOGY | Facility: CLINIC | Age: 75
End: 2025-05-12
Payer: MEDICARE

## 2025-05-12 DIAGNOSIS — T84.7XXD HARDWARE COMPLICATING WOUND INFECTION, SUBSEQUENT ENCOUNTER: ICD-10-CM

## 2025-05-12 PROCEDURE — 70486 CT MAXILLOFACIAL W/O DYE: CPT | Performed by: RADIOLOGY

## 2025-05-12 PROCEDURE — 70486 CT MAXILLOFACIAL W/O DYE: CPT

## 2025-05-23 ENCOUNTER — CLINICAL SUPPORT (OUTPATIENT)
Dept: PREADMISSION TESTING | Facility: HOSPITAL | Age: 75
End: 2025-05-23
Payer: MEDICARE

## 2025-05-23 RX ORDER — GUAIFENESIN 1200 MG
TABLET, EXTENDED RELEASE 12 HR ORAL
COMMUNITY
End: 2025-05-23 | Stop reason: SDUPTHER

## 2025-05-23 RX ORDER — GLUCOSAM/CHONDRO/HERB 149/HYAL 750-100 MG
1 TABLET ORAL 2 TIMES DAILY
COMMUNITY

## 2025-05-23 RX ORDER — ASCORBIC ACID 500 MG
500 TABLET ORAL DAILY
COMMUNITY

## 2025-05-23 RX ORDER — MELATONIN 1 MG
1 TABLET,CHEWABLE ORAL NIGHTLY PRN
COMMUNITY

## 2025-05-23 RX ORDER — ACETAMINOPHEN 500 MG
1000 TABLET ORAL EVERY 8 HOURS PRN
COMMUNITY

## 2025-05-23 RX ORDER — CETIRIZINE HYDROCHLORIDE 10 MG/1
10 TABLET ORAL DAILY
COMMUNITY

## 2025-05-23 RX ORDER — LANOLIN ALCOHOL/MO/W.PET/CERES
400 CREAM (GRAM) TOPICAL DAILY
COMMUNITY

## 2025-05-23 NOTE — CPM/PAT H&P
[facility]/PAT Evaluation       Name: Masoud Barger (Masoud Barger)  /Age: 1950/74 y.o.     {[facility] Visit Type:46924}      Chief Complaint: ***    HPI    Medical History[1]    Surgical History[2]    Patient Sexual activity questions deferred to the physician.    Family History[3]    Allergies[4]    Prior to Admission medications    Medication Sig Start Date End Date Taking? Authorizing Provider   aspirin 81 mg capsule Take 81 mg by mouth once daily.    Historical Provider, MD   atorvastatin (Lipitor) 40 mg tablet Take 1 tablet (40 mg) by mouth once daily at bedtime. 6/3/24   Historical Provider, MD   dulaglutide (Trulicity) 1.5 mg/0.5 mL pen injector injection Inject 1.5 mg under the skin 1 (one) time per week.    Historical Provider, MD   Eliquis 2.5 mg tablet Take 1 tablet (2.5 mg) by mouth 2 times a day.    Historical Provider, MD   esomeprazole (NexIUM) 40 mg DR capsule Take 1 capsule (40 mg) by mouth once daily. 22   Historical Provider, MD   famotidine (Pepcid) 40 mg tablet Take 1 tablet (40 mg) by mouth once daily at bedtime. 24   Historical Provider, MD   furosemide (Lasix) 40 mg tablet Take 2 tablets (80 mg) by mouth early in the morning.. 24   Historical Provider, MD   gabapentin (Neurontin) 300 mg capsule Take 1 capsule (300 mg) by mouth 2 times a day. 6/3/24   Historical Provider, MD   gabapentin (Neurontin) 300 mg capsule Take 1 capsule (300 mg) by mouth every 12 hours. 24   Historical Provider, MD   Gvoke HypoPen 2-Pack 1 mg/0.2 mL auto-injector if needed. 24   Historical Provider, MD Kristen Gallardo Insulin 100 unit/mL injection INJECT 8 UNITS THREE TIMES DAILY WITH EACH MEAL FOR 3 DAYS WHEN YOU HAVE STEROID INJECTION PLUS USE THE SLIDING SCALE -MAX OF 10 UNITS 24   Historical Provider, MD   levothyroxine (Synthroid, Levoxyl) 25 mcg tablet Take 1 tablet (25 mcg) by mouth once daily in the morning. Take before meals.    Historical Provider, MD suárezsone  (Nasonex) 50 mcg/actuation nasal spray Administer 2 sprays into each nostril once daily.    Historical Provider, MD   omeprazole (PriLOSEC) 40 mg DR capsule Take 1 capsule (40 mg) by mouth early in the morning.. 7/10/24   Historical Provider, MD   sotalol (Betapace) 120 mg tablet Take 1 tablet (120 mg) by mouth every 12 hours. 9/25/23   Historical Provider, MD   sotalol (Betapace) 120 mg tablet Take 1 tablet (120 mg) by mouth every 12 hours. 6/7/24   Historical Provider, MD Naomi Antunez U-300 Insulin 300 unit/mL (1.5 mL) injection INJECT 95 UNITS SUBCUTANEOUSLY IN THE MORNING    Historical Provider, MD   Trulicity 3 mg/0.5 mL pen injector Inject 3 mg as directed 1 (one) time per week. 7/2/24   Historical Provider, MD ASHWINI MARADIAGA Physical Exam     Airway    Testing/Diagnostic:   Echo 4/1/2025  Impression:   - Technically difficult exam due to body habitus.   - Exam indication: Re-evaluation of known heart failure with a change in clinical status without change in med/diet   - The left ventricle is normal in size. Left ventricular systolic function is normal. EF = 63 ± 5% (2D biplane) Definity contrast used for endocardial border detection.   - The right ventricle is dilated. Right ventricular systolic function is low normal.   - The left atrial cavity is severely dilated.   - The right atrial cavity is dilated.   - Post mitral valve repair. There is moderate (2+) mitral valve regurgitation.   Regurgitant orifice area (PISA) is 0.23 cm². The peak gradient is 18 mmHg and the mean gradient is 6 mmHg.   - There is no patent foramen ovale as detected by Doppler.   Addendum   1. See data above   2. Normal right and left ventricular systolic function   3. Redundancy noted at the level of mitral valve with moderate mitral regurgitation   4. Stage II left ventricular diastolic abnormality   5. Suboptimal study      EKG 3/30/2025  Impression:   Underlying rhythm probably atrial fibrillation   Vitas QRS rhythm.   Occasional ventricular paced beats seen.   Non-specific intra-ventricular conduction block   Lateral infarct , age undetermined   Abnormal ECG   When compared with ECG of 28-Mar-2025 18:49,   Probable atrial fibrillation has replaced electronic ventricular paced rhythm.      CXR 3/28/2025  Impression:   Cardiomegaly.  Mild pulmonary vascular congestion.      NM CARDIAC PERF STRESS/PHARM 6/27/2024  Impression:   1. SPECT Perfusion Study: Normal.   2. There is no scintigraphic evidence for inducible ischemia.   3. No evidence of scarred myocardium.   4. Left ventricle is mildly dilated. The left ventricle systolic function is normal.      Patient Specialist/PCP:   Medication Instructions: Please stop all vitamins, supplements, and any NSAIDs (Alevel, Ibuprofen, Motrin, etc) 7 days prior to surgery.  Patient had concerns with holding magnesium and potasium due to leg cramps, discussed with scheduled NP, ok to continue to take and will review at PAT visit. Called patient and relayed message.     Recent Hospitalization 3/28/25 - 4/3/25 ([facility]) - Presented with shortness of breath, weight gain, bilateral edema. In the ED, /82, HR 71, RR 20, O2 sat 98% cardiomegaly on CXR with vascular congestion. Admitted with acute decompensated heart failure with preserved ejection fraction    CPM/PAT  7/29/24 - JIHAN Shepard-CNP - perioperative evaluation in anticipation of Open Reduction Internal Fixation Mandible -Right on 8/1/24 with Dr. Muller.     PCP  [facility] - 4/10/25 - David Gary MD - (P) [phone] (F) [phone] - Hospital follow up for CHF exacerbation from 3/28 to 4/3.   KULWINDER on CPAP, asthma, COPD, CKD stage 3, Hx of CVA in 2002     Cardiology/ EP  [facility] - LOV 5/9/25 - JIHAN Hu.CNP / Tara Costa MD - SSS (sick sinus syndrome), Pacemaker, Persistent atrial fibrillation, on Eliquis 2.5 mg (due to history of previous GI bleeds) for stroke prevention.     Cardiology     -  Risk  stratification and Eliquis instructions requested   [facility] - LOV 12/5/24 - JIHAN Salazar.CNP / Ranjan José MD (P) [phone] (F) [phone] - 6 month FU history of CAD, s/p CABG x3 2/21/2018 (LIMA to LAD OSCAR to right PDA, and SVG to OM), s/p mitral valve repair 2/2018, hypertension, hyperlipidemia, SSS/bradycardia/complete heart block s/p Medtronic DC PPM placement in 2/2018, PAF     Oral & Maxillofacial Surgery   Ruel Muller, NEVA - s/p open reduction internal fixation of right mandible and placement of reconstruction plates and debridement of extraction site #32 (8/1/24).     Endocrine      Diabetes and Endocrinology - Ian Kruse MD - (P) [phone] (F) [phone] - Last office note scanned to media     Gastrointestinal  [facility] - 9/11/24 - Colonoscopy - [facility] Graham Dyer MD - GERD, constipation, Hx of GI bleeding 2018 in the setting of Xarelto, aspirin, Plavix use, has been taken off Plavix, and now currently on aspirin and low-dose Eliquis       Visit Vitals  Smoking Status Former       DASI Risk Score      Flowsheet Row Questionnaire Series Submission from 5/16/2025 in [facility] Keisha OR with Generic Provider Jodee Pre-Admission Testing from 7/29/2024 in [facility]   Can you take care of yourself (eat, dress, bathe, or use toilet)?  2.75  filed at 05/16/2025 1646 2.75 filed at 07/29/2024 1004   Can you walk indoors, such as around your house? 1.75  filed at 05/16/2025 1646 1.75 filed at 07/29/2024 1004   Can you walk a block or two on level ground?  2.75  filed at 05/16/2025 1646 2.75 filed at 07/29/2024 1004   Can you climb a flight of stairs or walk up a hill? 5.5  filed at 05/16/2025 1646 5.5 filed at 07/29/2024 1004   Can you run a short distance? 0  filed at 05/16/2025 1646 0 filed at 07/29/2024 1004   Can you do light work around the house like dusting or washing dishes? 2.7  filed at 05/16/2025 1646 2.7 filed at 07/29/2024 1004   Can you do  moderate work around the house like vacuuming, sweeping floors or carrying groceries? 3.5  filed at 05/16/2025 1646 3.5 filed at 07/29/2024 1004   Can you do heavy work around the house like scrubbing floors or lifting and moving heavy furniture?  0  filed at 05/16/2025 1646 0 filed at 07/29/2024 1004   Can you do yard work like raking leaves, weeding or pushing a mower? 4.5  filed at 05/16/2025 1646 0 filed at 07/29/2024 1004   Can you have sexual relations? 0  filed at 05/16/2025 1646 0 filed at 07/29/2024 1004   Can you participate in moderate recreational activities like golf, bowling, dancing, doubles tennis or throwing a baseball or football? 0  filed at 05/16/2025 1646 0 filed at 07/29/2024 1004   Can you participate in strenous sports like swimming, singles tennis, football, basketball, or skiing? 0  filed at 05/16/2025 1646 0 filed at 07/29/2024 1004   DASI SCORE 23.45  filed at 05/16/2025 1646 18.95 filed at 07/29/2024 1004   METS Score (Will be calculated only when all the questions are answered) 5.6  filed at 05/16/2025 1646 5.1 filed at 07/29/2024 1004          Caprini DVT Assessment      Flowsheet Row Admission (Discharged) from 8/1/2024 in [facility] 9 with Ruel Muller DDS Pre-Admission Testing from 7/29/2024 in [facility]   DVT Score (IF A SCORE IS NOT CALCULATING, MUST SELECT A BMI TO COMPLETE) 11 filed at 08/01/2024 0952 19 filed at 07/29/2024 1057   BMI (BMI MUST BE CHOSEN) Greater than 50 (Venous stasis syndrome) filed at 08/01/2024 0952 41-50 (Morbid obesity) filed at 07/29/2024 1057   RETIRED: Current Status Major surgery planned, lasting 2-3 hours filed at 08/01/2024 0952 Major surgery planned, lasting over 3 hours filed at 07/29/2024 1057   RETIRED: History Congestive heart failure, Previous malignancy filed at 08/01/2024 0952 Prior major surgery, Congestive heart failure, Previous malignancy, Stroke filed at 07/29/2024 1057   RETIRED: Age 60-75  years filed at 08/01/2024 0952 60-75 years filed at 07/29/2024 1057          Modified Frailty Index      Flowsheet Row Pre-Admission Testing from 7/29/2024 in [facility]   Non-independent functional status (problems with dressing, bathing, personal grooming, or cooking) 0.0909 filed at 07/29/2024 1057   History of diabetes mellitus  0.0909 filed at 07/29/2024 1057   History of COPD 0 filed at 07/29/2024 1057   History of CHF 0.0909 filed at 07/29/2024 1057   History of MI 0 filed at 07/29/2024 1057   History of Percutaneous Coronary Intervention, Cardiac Surgery, or Angina 0.0909 filed at 07/29/2024 1057   Hypertension requiring the use of medication  0.0909 filed at 07/29/2024 1057   Peripheral vascular disease 0 filed at 07/29/2024 1057   Impaired sensorium (cognitive impairement or loss, clouding, or delirium) 0 filed at 07/29/2024 1057   TIA or CVA withouy residual deficit 0.0909 filed at 07/29/2024 1057   Cerebrovascular accident with deficit 0 filed at 07/29/2024 1057   Modified Frailty Index Calculator .5454 filed at 07/29/2024 1057          [id number] Stroke Risk Points  Current as of just now        4 0 to 9 Points:      Last Change:           The [id number] risk score (Lip SAVANNAH, et al. 2009. © 2010 [employer]) quantifies the risk of stroke for a patient with atrial fibrillation. For patients without atrial fibrillation or under the age of 18 this score appears as N/A. Higher score values generally indicate higher risk of stroke.          Points Metrics   1 Has Congestive Heart Failure:  Yes      Patients with congestive heart failure get 1 point.    Current as of just now   1 Has Hypertension:  Yes     Patients with hypertension get 1 point.    Current as of just now   1 Age:  74     Patients 65 to 74 years old get 1 point, or patients 75 years and older get 2 points.    Current as of just now   1 Has Diabetes:  Yes     Patients with diabetes get 1 point.     Current as of just now   0 Had Stroke:  No  Had TIA:  No  Had Thromboembolism:  No     Patients who have had a stroke, TIA, or thromboembolism get 2 points.    Current as of just now   0 Has Vascular Disease:  No     Patients with vascular disease get 1 point.    Current as of just now   0 Clinically Relevant Sex:  Male     Patients with a clinically relevant sex of Female get 1 point.    Current as of just now             Revised Cardiac Risk Index      Flowsheet Row Pre-Admission Testing from 7/29/2024 in [facility]   High-Risk Surgery (Intraperitoneal, Intrathoracic,Suprainguinal vascular) 0 filed at 07/29/2024 1056   History of ischemic heart disease (History of MI, History of positive exercuse test, Current chest paint considered due to myocardial ischemia, Use of nitrate therapy, ECG with pathological Q Waves) 1 filed at 07/29/2024 1056   History of congestive heart failure (pulmonary edemia, bilateral rales or S3 gallop, Paroxysmal nocturnal dyspnea, CXR showing pulmonary vascular redistribution) 1 filed at 07/29/2024 1056   History of cerebrovascular disease (Prior TIA or stroke) 1 filed at 07/29/2024 1056   Pre-operative insulin treatment 1 filed at 07/29/2024 1056   Pre-operative creatinine>2 mg/dl 0 filed at 07/29/2024 1056   Revised Cardiac Risk Calculator 4 filed at 07/29/2024 1056          Apfel Simplified Score      Flowsheet Row Pre-Admission Testing from 7/29/2024 in [facility]   Smoking status 1 filed at 07/29/2024 1057   History of motion sickness or PONV  0 filed at 07/29/2024 1057   Use of postoperative opioids 1 filed at 07/29/2024 1057   Gender - Female 0=No filed at 07/29/2024 1057   Apfel Simplified Score Calculator 2 filed at 07/29/2024 1057          Risk Analysis Index Results This Encounter    No data found in the last 10 encounters.       Stop Bang Score      Flowsheet Row Admission (Discharged) from 8/1/2024 in [place] [id number]  with Ruel Muller DDS Pre-Admission Testing from 7/29/2024 in [facility]   Do you snore loudly? 1 filed at 08/01/2024 0729 1 filed at 07/29/2024 1001   Do you often feel tired or fatigued after your sleep? 1 filed at 08/01/2024 0729 1 filed at 07/29/2024 1001   Has anyone ever observed you stop breathing in your sleep? 1 filed at 08/01/2024 0729 1 filed at 07/29/2024 1001   Do you have or are you being treated for high blood pressure? 1 filed at 08/01/2024 0729 1 filed at 07/29/2024 1001   Recent BMI (Calculated) 53.5 filed at 08/01/2024 0729 0 filed at 07/29/2024 1001   Is BMI greater than 35 kg/m2? 1=Yes filed at 08/01/2024 0729 --   Age older than 50 years old? 1=Yes filed at 08/01/2024 0729 1=Yes filed at 07/29/2024 1001   Is your neck circumference greater than 17 inches (Male) or 16 inches (Female)? 1 filed at 08/01/2024 0729 0 filed at 07/29/2024 1001   Gender - Male 1=Yes filed at 08/01/2024 0729 1=Yes filed at 07/29/2024 1001   STOP-BANG Total Score 8 filed at 08/01/2024 0729 --          Prodigy: High Risk  Total Score: 30              Prodigy Age Score      Prodigy Gender Score     Prodigy Previous Opioid Use Score      Prodigy CHF score          ARISCAT Score for Postoperative Pulmonary Complications    No data to display       Ybarra Perioperative Risk for Myocardial Infarction or Cardiac Arrest (SHOSHANA)    No data to display         Assessment and Plan:     {[facility] ASSESSMENT AND PLAN:95238}             [1]   Past Medical History:  Diagnosis Date    A-fib (Multi)     s/p DCCV in 2/2020    Acute on chronic heart failure with preserved ejection fraction (HFpEF) 03/29/2025    Anemia     Arrhythmia     Arthritis     Asthma     Bradycardia     Carpal tunnel syndrome     CHF (congestive heart failure)     Chronic diastolic heart failure    Chronic pain disorder     CKD (chronic kidney disease)     Complete heart block     s/p Medtronic DC PPM placement in 3/2018,    Coronary artery  disease     s/p 3 vessel CABG and mitral valve repair in 2/2018    ED (erectile dysfunction)     GERD (gastroesophageal reflux disease)     GI (gastrointestinal bleed) 2018    in the setting of Xarelto, aspirin, Plavix use    Heart valve disease     s/p mitral valve repair 2/2018    History of blood transfusion     after CABG in 2018    Hyperlipidemia     Hypertension     Hypothyroidism     Mandible fracture (Multi)     Obesity     Palpitations     Peripheral neuropathy     PONV (postoperative nausea and vomiting)     Presence of cardiac pacemaker     Medtronic DC PPM placement in 2/2018    Rectal bleeding     with anticoagulation in 2018    Sleep apnea     uses CPAP    SSS (sick sinus syndrome) (Multi)     Medtronic DC PPM placement in 2/2018    Stroke (Multi) 2002    Testicular cancer (Multi)     Thyroid nodule     Type 2 diabetes mellitus     Vision loss    [2]   Past Surgical History:  Procedure Laterality Date    CARDIAC CATHETERIZATION  04/14/2003    CARDIAC CATHETERIZATION  02/20/2018    CARDIAC CATHETERIZATION  09/30/2021    CARDIOVERSION  02/17/2020    CARDIOVERSION  01/29/2020    CHOLECYSTECTOMY  12/28/2006    COLONOSCOPY W/ BIOPSIES      CORONARY ARTERY BYPASS GRAFT  02/21/2018    HAND SURGERY  04/20/2023    s/p Left hand CTR and Left CMC arthroplasty    INSERT / REPLACE / REMOVE PACEMAKER  03/01/2018    MANDIBLE FRACTURE SURGERY Right 08/01/2024    MITRAL VALVE REPAIR  02/2018    ORCHIECTOMY      ORCHIECTOMY SIMPLE / PARTIAL / RADICAL W/ SCROTAL / INGUINAL APPROACH      ORIF WRIST FRACTURE      TONSILLECTOMY      UPPER GASTROINTESTINAL ENDOSCOPY  04/16/2024    UPPER GI ENDOSCOPY, BIOPSY   [3]   Family History  Problem Relation Name Age of Onset    Heart disease Mother      Hypertension Mother      Cancer Father      Hypertension Sister      Heart disease Brother      Heart attack Brother      Hypertension Brother     [4]   Allergies  Allergen Reactions    Shellfish Containing Products Anaphylaxis      Swelling throat      Adhesive Tape-Silicones Hives     SILK tape    Levaquin [Levofloxacin] Other     Extreme leg pain    Shellfish Derived Nausea/vomiting    Voltaren [Diclofenac Sodium] Other     Elevated blood pressure    Voltaren [Diclofenac Sodium] Other     Elevated BP      Adhesive Tape-Silicones Rash     Rash with silk tape

## 2025-05-29 ENCOUNTER — PRE-ADMISSION TESTING (OUTPATIENT)
Dept: PREADMISSION TESTING | Facility: HOSPITAL | Age: 75
End: 2025-05-29
Payer: MEDICARE

## 2025-05-29 VITALS
OXYGEN SATURATION: 98 % | TEMPERATURE: 97.6 F | HEIGHT: 70 IN | WEIGHT: 315 LBS | BODY MASS INDEX: 45.1 KG/M2 | SYSTOLIC BLOOD PRESSURE: 120 MMHG | HEART RATE: 93 BPM | DIASTOLIC BLOOD PRESSURE: 69 MMHG

## 2025-05-29 DIAGNOSIS — M86.9 OSTEOMYELITIS, UNSPECIFIED SITE, UNSPECIFIED TYPE (MULTI): Primary | ICD-10-CM

## 2025-05-29 LAB
ABO GROUP (TYPE) IN BLOOD: NORMAL
ANION GAP SERPL CALC-SCNC: 14 MMOL/L (ref 10–20)
ANTIBODY SCREEN: NORMAL
BUN SERPL-MCNC: 41 MG/DL (ref 6–23)
CALCIUM SERPL-MCNC: 9.1 MG/DL (ref 8.6–10.6)
CHLORIDE SERPL-SCNC: 99 MMOL/L (ref 98–107)
CO2 SERPL-SCNC: 29 MMOL/L (ref 21–32)
CREAT SERPL-MCNC: 1.67 MG/DL (ref 0.5–1.3)
EGFRCR SERPLBLD CKD-EPI 2021: 43 ML/MIN/1.73M*2
ERYTHROCYTE [DISTWIDTH] IN BLOOD BY AUTOMATED COUNT: 22.9 % (ref 11.5–14.5)
GLUCOSE SERPL-MCNC: 150 MG/DL (ref 74–99)
HCT VFR BLD AUTO: 30.2 % (ref 41–52)
HGB BLD-MCNC: 9.2 G/DL (ref 13.5–17.5)
MCH RBC QN AUTO: 22.8 PG (ref 26–34)
MCHC RBC AUTO-ENTMCNC: 30.5 G/DL (ref 32–36)
MCV RBC AUTO: 75 FL (ref 80–100)
NRBC BLD-RTO: 0 /100 WBCS (ref 0–0)
PLATELET # BLD AUTO: 194 X10*3/UL (ref 150–450)
POTASSIUM SERPL-SCNC: 4.2 MMOL/L (ref 3.5–5.3)
RBC # BLD AUTO: 4.04 X10*6/UL (ref 4.5–5.9)
RH FACTOR (ANTIGEN D): NORMAL
SODIUM SERPL-SCNC: 138 MMOL/L (ref 136–145)
WBC # BLD AUTO: 9 X10*3/UL (ref 4.4–11.3)

## 2025-05-29 PROCEDURE — 87081 CULTURE SCREEN ONLY: CPT

## 2025-05-29 PROCEDURE — 36415 COLL VENOUS BLD VENIPUNCTURE: CPT

## 2025-05-29 PROCEDURE — 99205 OFFICE O/P NEW HI 60 MIN: CPT | Performed by: NURSE PRACTITIONER

## 2025-05-29 PROCEDURE — 85027 COMPLETE CBC AUTOMATED: CPT

## 2025-05-29 PROCEDURE — 86850 RBC ANTIBODY SCREEN: CPT

## 2025-05-29 PROCEDURE — 80048 BASIC METABOLIC PNL TOTAL CA: CPT

## 2025-05-29 ASSESSMENT — DUKE ACTIVITY SCORE INDEX (DASI)
DASI METS SCORE: 5.1
CAN YOU PARTICIPATE IN STRENOUS SPORTS LIKE SWIMMING, SINGLES TENNIS, FOOTBALL, BASKETBALL, OR SKIING: NO
CAN YOU DO HEAVY WORK AROUND THE HOUSE LIKE SCRUBBING FLOORS OR LIFTING AND MOVING HEAVY FURNITURE: NO
CAN YOU CLIMB A FLIGHT OF STAIRS OR WALK UP A HILL: YES
TOTAL_SCORE: 18.95
CAN YOU TAKE CARE OF YOURSELF (EAT, DRESS, BATHE, OR USE TOILET): YES
CAN YOU PARTICIPATE IN MODERATE RECREATIONAL ACTIVITIES LIKE GOLF, BOWLING, DANCING, DOUBLES TENNIS OR THROWING A BASEBALL OR FOOTBALL: NO
CAN YOU DO LIGHT WORK AROUND THE HOUSE LIKE DUSTING OR WASHING DISHES: YES
CAN YOU WALK INDOORS, SUCH AS AROUND YOUR HOUSE: YES
CAN YOU WALK A BLOCK OR TWO ON LEVEL GROUND: YES
CAN YOU RUN A SHORT DISTANCE: NO
CAN YOU DO YARD WORK LIKE RAKING LEAVES, WEEDING OR PUSHING A MOWER: NO
CAN YOU HAVE SEXUAL RELATIONS: NO
CAN YOU DO MODERATE WORK AROUND THE HOUSE LIKE VACUUMING, SWEEPING FLOORS OR CARRYING GROCERIES: YES

## 2025-05-29 ASSESSMENT — ENCOUNTER SYMPTOMS
NEUROLOGICAL NEGATIVE: 1
CONSTITUTIONAL NEGATIVE: 1
NECK NEGATIVE: 1
ENDOCRINE NEGATIVE: 1
RESPIRATORY NEGATIVE: 1
ARTHRALGIAS: 1
PALPITATIONS: 1
GASTROINTESTINAL NEGATIVE: 1

## 2025-05-29 ASSESSMENT — LIFESTYLE VARIABLES: SMOKING_STATUS: NONSMOKER

## 2025-05-29 NOTE — PREPROCEDURE INSTRUCTIONS
Fasting Guidelines    Why must I stop eating and drinking near surgery time?  With sedation, food or liquid in your stomach can enter your lungs causing serious complications  Increases nausea and vomiting    When do I need to stop eating and drinking before my surgery?  Do not eat any food or drink any liquids after midnight the night before your surgery/procedure.  You may have sips of water to take medications.    Additional Instructions:     Avoid herbal supplements, multivitamins and NSAIDS (non-steroidal anti-inflammatory drugs) such as Advil, Aleve, Ibuprofen, Naproxen, Excedrin, Meloxicam or Celebrex for at least 7 days prior to surgery. May take Tylenol as needed.    Avoid tobacco and alcohol products for 24 hours prior to surgery.    CONTACT SURGEON'S OFFICE IF YOU DEVELOP:  * Fever = 100.4 F   * New respiratory symptoms (e.g. cough, shortness of breath, respiratory distress, sore throat)  * Recent loss of taste or smell  *Flu like symptoms such as headache, fatigue or gastrointestinal symptoms  * You develop any open sores, shingles, burning or painful urination   AND/OR:  * You no longer wish to have the surgery.  * Any other personal circumstances change that may lead to the need to cancel or defer this surgery.  *You were admitted to any hospital within one week of your planned procedure.    Seven/Six Days before Surgery:  Review your medication instructions, stop indicated medications    Day of Surgery:  Review your medication instructions, take indicated medications  Wear comfortable loose fitting clothing  Do not use moisturizers, creams, lotions or perfume  All jewelry and valuables should be left at home      [facility]  [phone]       Preoperative Brain Exercises    What are brain exercises?  A brain exercise is any activity that engages your thinking (cognitive) skills.    What types of activities are considered brain exercises?  Jigsaw puzzles, crossword  puzzles, word jumble, memory games, word search, and many more.  Many can be found free online or on your phone via a mobile amber.    Why should I do brain exercises before my surgery?  More recent research has shown brain exercise before surgery can lower the risk of postoperative delirium (confusion) which can be especially important for older adults.  Patients who did brain exercises for 5 to 10 hours the days before surgery, cut their risk of postoperative delirium in half up to 1 week after surgery.         [facility]    Preoperative Deep Breathing Exercises    Why it is important to do deep breathing exercises before my surgery?  Deep breathing exercises strengthen your breathing muscles.  This helps you to recover after your surgery and decreases the chance of breathing complications.      How are the deep breathing exercises done?  Sit straight with your back supported.  Breathe in deeply and slowly through your nose. Your lower rib cage should expand and your abdomen may move forward.  Hold that breath for 3 to 5 seconds.  Breathe out through pursed lips, slowly and completely.  Rest and repeat 10 times every hour while awake.  Rest longer if you become dizzy or lightheaded.         Patient and Family Education             Ways You Can Help Prevent Blood Clots             This handout explains some simple things you can do to help prevent blood clots.      Blood clots are blockages that can form in the body's veins. When a blood clot forms in your deep veins, it may be called a deep vein thrombosis, or DVT for short. Blood clots can happen in any part of the body where blood flows, but they are most common in the arms and legs. If a piece of a blood clot breaks free and travels to the lungs, it is called a pulmonary embolus (PE). A PE can be a very serious problem.         Being in the hospital or having surgery can raise your chances of getting a blood clot because you may not be  well enough to move around as much as you normally do.         Ways you can help prevent blood clots in the hospital         Wearing SCDs. SCDs stands for Sequential Compression Devices.   SCDs are special sleeves that wrap around your legs  They attach to a pump that fills them with air to gently squeeze your legs every few minutes.   This helps return the blood in your legs to your heart.   SCDs should only be taken off when walking or bathing.   SCDs may not be comfortable, but they can help save your life.               Wearing compression stockings - if your doctor orders them. These special snug fitting stockings gently squeeze your legs to help blood flow.       Walking. Walking helps move the blood in your legs.   If your doctor says it is ok, try walking the halls at least   5 times a day. Ask us to help you get up, so you don't fall.      Taking any blood thinning medicines your doctor orders.        Page 1 of 2     [facility]; 3/23   Ways you can help prevent blood clots at home       Wearing compression stockings - if your doctor orders them. ? Walking - to help move the blood in your legs.       Taking any blood thinning medicines your doctor orders.      Signs of a blood clot or PE      Tell your doctor or nurse know right away if you have of the problems listed below.    If you are at home, seek medical care right away. Call 911 for chest pain or problems breathing.               Signs of a blood clot (DVT) - such as pain,  swelling, redness or warmth in your arm or leg      Signs of a pulmonary embolism (PE) - such as chest     pain or feeling short of breath

## 2025-05-29 NOTE — CPM/PAT H&P
CPM/PAT Evaluation       Name: Masoud Barger (Masoud Barger)  /Age: 1950/74 y.o.     Visit Type:   In-Person       Chief Complaint: Osteomyelitis, unspecified site, unspecified type (Multi)    HPI  Masoud Barger is a 74 y.o. male with osteomyelitis referred to [facility] by Dr. Muller for perioperative evaluation in advance of Mandible Reconstruction and Face Hardware Removal scheduled on 25.       Medical History[1]    Surgical History[2]    Patient Sexual activity questions deferred to the physician.    Family History[3]    Allergies[4]    Prior to Admission medications    Medication Sig Start Date End Date Taking? Authorizing Provider   aspirin 81 mg capsule Take 81 mg by mouth once daily.    Historical Provider, MD   atorvastatin (Lipitor) 40 mg tablet Take 1 tablet (40 mg) by mouth once daily at bedtime. 6/3/24   Historical Provider, MD   dulaglutide (Trulicity) 1.5 mg/0.5 mL pen injector injection Inject 1.5 mg under the skin 1 (one) time per week.    Historical Provider, MD   Eliquis 2.5 mg tablet Take 1 tablet (2.5 mg) by mouth 2 times a day.    Historical Provider, MD   esomeprazole (NexIUM) 40 mg DR capsule Take 1 capsule (40 mg) by mouth once daily. 22   Historical Provider, MD   famotidine (Pepcid) 40 mg tablet Take 1 tablet (40 mg) by mouth once daily at bedtime. 24   Historical Provider, MD   furosemide (Lasix) 40 mg tablet Take 2 tablets (80 mg) by mouth early in the morning.. 24   Historical Provider, MD   gabapentin (Neurontin) 300 mg capsule Take 1 capsule (300 mg) by mouth 2 times a day. 6/3/24   Historical Provider, MD   gabapentin (Neurontin) 300 mg capsule Take 1 capsule (300 mg) by mouth every 12 hours. 24   Historical Provider, MD   Gvoke HypoPen 2-Pack 1 mg/0.2 mL auto-injector if needed. 24   Historical Provider, MD Kristen Gallardo Insulin 100 unit/mL injection INJECT 8 UNITS THREE TIMES DAILY WITH EACH MEAL FOR 3 DAYS WHEN YOU HAVE STEROID INJECTION  PLUS USE THE SLIDING SCALE -MAX OF 10 UNITS 4/29/24   Historical Provider, MD   levothyroxine (Synthroid, Levoxyl) 25 mcg tablet Take 1 tablet (25 mcg) by mouth once daily in the morning. Take before meals.    Historical Provider, MD   mometasone (Nasonex) 50 mcg/actuation nasal spray Administer 2 sprays into each nostril once daily.    Historical Provider, MD   omeprazole (PriLOSEC) 40 mg DR capsule Take 1 capsule (40 mg) by mouth early in the morning.. 7/10/24   Historical Provider, MD   sotalol (Betapace) 120 mg tablet Take 1 tablet (120 mg) by mouth every 12 hours. 9/25/23   Historical Provider, MD   sotalol (Betapace) 120 mg tablet Take 1 tablet (120 mg) by mouth every 12 hours. 6/7/24   Historical Provider, MD Naomi Antunez U-300 Insulin 300 unit/mL (1.5 mL) injection INJECT 95 UNITS SUBCUTANEOUSLY IN THE MORNING    Historical Provider, MD   Trulicity 3 mg/0.5 mL pen injector Inject 3 mg as directed 1 (one) time per week. 7/2/24   Historical Provider, MD MARADIAGA ROS:   Constitutional:   neg    Neuro/Psych:   neg    Eyes:    use of corrective lenses  Ears:   neg    Nose:   neg    Mouth:    mouth pain  Throat:   neg    Neck:   neg    Cardio:    palpitations  Respiratory:   neg    Endocrine:   neg    GI:   neg    :   neg    Musculoskeletal:    arthralgias  Hematologic:   neg    Skin:  neg        Physical Exam  Vitals reviewed.   Constitutional:       Appearance: He is obese.   HENT:      Head: Normocephalic and atraumatic.      Nose: Nose normal.      Mouth/Throat:      Mouth: Mucous membranes are moist.   Cardiovascular:      Rate and Rhythm: Normal rate.      Pulses: Normal pulses.   Pulmonary:      Effort: Pulmonary effort is normal.   Abdominal:      Palpations: Abdomen is soft.   Musculoskeletal:         General: Normal range of motion.      Cervical back: Normal range of motion.   Skin:     General: Skin is warm.   Neurological:      General: No focal deficit present.      Mental Status: He is  "alert and oriented to person, place, and time.      Motor: Weakness present.      Gait: Gait abnormal (ambulates with cane).   Psychiatric:         Mood and Affect: Mood normal.         Behavior: Behavior normal.          PAT AIRWAY:   Airway:     Mallampati::  II    TM distance::  >3 FB    Neck ROM::  Full   upper dentures        Visit Vitals  /69   Pulse 93   Temp 36.4 °C (97.6 °F)   Ht 1.778 m (5' 10\")   Wt 101 kg (222 lb)   SpO2 98%   BMI 31.85 kg/m²   Smoking Status Former   BSA 2.23 m²       DASI Risk Score      Flowsheet Row Pre-Admission Testing from 5/29/2025 in [facility] Questionnaire Series Submission from 5/16/2025 in [facility] OR with Generic Provider Jodee   Can you take care of yourself (eat, dress, bathe, or use toilet)?  2.75 filed at 05/29/2025 0928 2.75  filed at 05/16/2025 1646   Can you walk indoors, such as around your house? 1.75 filed at 05/29/2025 0928 1.75  filed at 05/16/2025 1646   Can you walk a block or two on level ground?  2.75 filed at 05/29/2025 0928 2.75  filed at 05/16/2025 1646   Can you climb a flight of stairs or walk up a hill? 5.5 filed at 05/29/2025 0928 5.5  filed at 05/16/2025 1646   Can you run a short distance? 0 filed at 05/29/2025 0928 0  filed at 05/16/2025 1646   Can you do light work around the house like dusting or washing dishes? 2.7 filed at 05/29/2025 0928 2.7  filed at 05/16/2025 1646   Can you do moderate work around the house like vacuuming, sweeping floors or carrying groceries? 3.5 filed at 05/29/2025 0928 3.5  filed at 05/16/2025 1646   Can you do heavy work around the house like scrubbing floors or lifting and moving heavy furniture?  0 filed at 05/29/2025 0928 0  filed at 05/16/2025 1646   Can you do yard work like raking leaves, weeding or pushing a mower? 0 filed at 05/29/2025 0928 4.5  filed at 05/16/2025 1646   Can you have sexual relations? 0 filed at 05/29/2025 0928 0  filed at 05/16/2025 1646 "   Can you participate in moderate recreational activities like golf, bowling, dancing, doubles tennis or throwing a baseball or football? 0 filed at 05/29/2025 0928 0  filed at 05/16/2025 1646   Can you participate in strenous sports like swimming, singles tennis, football, basketball, or skiing? 0 filed at 05/29/2025 0928 0  filed at 05/16/2025 1646   DASI SCORE 18.95 filed at 05/29/2025 0928 23.45  filed at 05/16/2025 1646   METS Score (Will be calculated only when all the questions are answered) 5.1 filed at 05/29/2025 0928 5.6  filed at 05/16/2025 1646          Caprini DVT Assessment      Flowsheet Row Admission (Discharged) from 8/1/2024 in [facility] 9 with Ruel Muller DDS Pre-Admission Testing from 7/29/2024 in [facility]   DVT Score (IF A SCORE IS NOT CALCULATING, MUST SELECT A BMI TO COMPLETE) 11 filed at 08/01/2024 0952 19 filed at 07/29/2024 1057   BMI (BMI MUST BE CHOSEN) Greater than 50 (Venous stasis syndrome) filed at 08/01/2024 0952 41-50 (Morbid obesity) filed at 07/29/2024 1057   RETIRED: Current Status Major surgery planned, lasting 2-3 hours filed at 08/01/2024 0952 Major surgery planned, lasting over 3 hours filed at 07/29/2024 1057   RETIRED: History Congestive heart failure, Previous malignancy filed at 08/01/2024 0952 Prior major surgery, Congestive heart failure, Previous malignancy, Stroke filed at 07/29/2024 1057   RETIRED: Age 60-75 years filed at 08/01/2024 0952 60-75 years filed at 07/29/2024 1057          Modified Frailty Index      Flowsheet Row Pre-Admission Testing from 7/29/2024 in [facility]   Non-independent functional status (problems with dressing, bathing, personal grooming, or cooking) 0.0909 filed at 07/29/2024 1057   History of diabetes mellitus  0.0909 filed at 07/29/2024 1057   History of COPD 0 filed at 07/29/2024 1057   History of CHF 0.0909 filed at 07/29/2024 1057   History of MI 0 filed at 07/29/2024  1057   History of Percutaneous Coronary Intervention, Cardiac Surgery, or Angina 0.0909 filed at 07/29/2024 1057   Hypertension requiring the use of medication  0.0909 filed at 07/29/2024 1057   Peripheral vascular disease 0 filed at 07/29/2024 1057   Impaired sensorium (cognitive impairement or loss, clouding, or delirium) 0 filed at 07/29/2024 1057   TIA or CVA withouy residual deficit 0.0909 filed at 07/29/2024 1057   Cerebrovascular accident with deficit 0 filed at 07/29/2024 1057   Modified Frailty Index Calculator .5454 filed at 07/29/2024 1057          [id number] Stroke Risk Points  Current as of just now        4 0 to 9 Points:      Last Change:           The [id number] risk score (Lip GH, et al. 2009. © 2010 [employer]) quantifies the risk of stroke for a patient with atrial fibrillation. For patients without atrial fibrillation or under the age of 18 this score appears as N/A. Higher score values generally indicate higher risk of stroke.          Points Metrics   1 Has Congestive Heart Failure:  Yes      Patients with congestive heart failure get 1 point.    Current as of just now   1 Has Hypertension:  Yes     Patients with hypertension get 1 point.    Current as of just now   1 Age:  74     Patients 65 to 74 years old get 1 point, or patients 75 years and older get 2 points.    Current as of just now   1 Has Diabetes:  Yes     Patients with diabetes get 1 point.    Current as of just now   0 Had Stroke:  No  Had TIA:  No  Had Thromboembolism:  No     Patients who have had a stroke, TIA, or thromboembolism get 2 points.    Current as of just now   0 Has Vascular Disease:  No     Patients with vascular disease get 1 point.    Current as of just now   0 Clinically Relevant Sex:  Male     Patients with a clinically relevant sex of Female get 1 point.    Current as of just now             Revised Cardiac Risk Index      Flowsheet Row Pre-Admission Testing from 7/29/2024 in   [facility]   High-Risk Surgery (Intraperitoneal, Intrathoracic,Suprainguinal vascular) 0 filed at 07/29/2024 1056   History of ischemic heart disease (History of MI, History of positive exercuse test, Current chest paint considered due to myocardial ischemia, Use of nitrate therapy, ECG with pathological Q Waves) 1 filed at 07/29/2024 1056   History of congestive heart failure (pulmonary edemia, bilateral rales or S3 gallop, Paroxysmal nocturnal dyspnea, CXR showing pulmonary vascular redistribution) 1 filed at 07/29/2024 1056   History of cerebrovascular disease (Prior TIA or stroke) 1 filed at 07/29/2024 1056   Pre-operative insulin treatment 1 filed at 07/29/2024 1056   Pre-operative creatinine>2 mg/dl 0 filed at 07/29/2024 1056   Revised Cardiac Risk Calculator 4 filed at 07/29/2024 1056          Apfel Simplified Score      Flowsheet Row Pre-Admission Testing from 7/29/2024 in [facility]   Smoking status 1 filed at 07/29/2024 1057   History of motion sickness or PONV  0 filed at 07/29/2024 1057   Use of postoperative opioids 1 filed at 07/29/2024 1057   Gender - Female 0=No filed at 07/29/2024 1057   Apfel Simplified Score Calculator 2 filed at 07/29/2024 1057          Risk Analysis Index Results This Encounter    No data found in the last 10 encounters.       Stop Bang Score      Flowsheet Row Pre-Admission Testing from 5/29/2025 in [facility] Admission (Discharged) from 8/1/2024 in [facility] 9 with Ruel Muller DDS   Do you snore loudly? 1 filed at 05/29/2025 0928 1 filed at 08/01/2024 0729   Do you often feel tired or fatigued after your sleep? 1 filed at 05/29/2025 0928 1 filed at 08/01/2024 0729   Has anyone ever observed you stop breathing in your sleep? 1 filed at 05/29/2025 0928 1 filed at 08/01/2024 0729   Do you have or are you being treated for high blood pressure? 1 filed at 05/29/2025 0928 1 filed at 08/01/2024 0729    Recent BMI (Calculated) 31.9 filed at 05/29/2025 0928 53.5 filed at 08/01/2024 0729   Is BMI greater than 35 kg/m2? 0=No filed at 05/29/2025 0928 1=Yes filed at 08/01/2024 0729   Age older than 50 years old? 1=Yes filed at 05/29/2025 0928 1=Yes filed at 08/01/2024 0729   Is your neck circumference greater than 17 inches (Male) or 16 inches (Female)? 0 filed at 05/29/2025 0928 1 filed at 08/01/2024 0729   Gender - Male 1=Yes filed at 05/29/2025 0928 1=Yes filed at 08/01/2024 0729   STOP-BANG Total Score 6 filed at 05/29/2025 0928 8 filed at 08/01/2024 0729          Prodigy: High Risk  Total Score: 30              Prodigy Age Score      Prodigy Gender Score     Prodigy Previous Opioid Use Score      Prodigy CHF score          ARISCAT Score for Postoperative Pulmonary Complications    No data to display       Ybarra Perioperative Risk for Myocardial Infarction or Cardiac Arrest (SHOSHANA)    No data to display         Assessment and Plan:     Anesthesia  The patient notes anesthesia complications in the past related to PONV    Neurology  The patient has diagnoses or significant findings on chart review or clinical presentation and evaluation significant for history of CVA in 2002. No residual issues. On aspirin 81mg by mouth daily. . The patient is at increased risk for postoperative delirium secondary to age 65 or older, decreased functional status, polypharmacy, renal Insufficiency. The patient is at increased risk for perioperative stroke secondary to prior CVA/TIA, a-fib, chronic renal failure, hypertension , perioperative interruption of antithrombotic, increased age, hyperlipidemia, diabetes mellitus, general anesthesia, operative time >2.5 hours. Handouts for preoperative brain exercises given to patient.   HEENT/Airway  No documented or reported history of airway difficulty.     Cardiovascular  The patient is scheduled for non-cardiac surgery associated with elevated risk. The patient has no major cardiac  contraindications to non- cardiac surgery.    EKG  The patient has no EKG or echocardiographic changes concerning for myocardial ischemia.     Heart Failure  The patient has a known history of heart failure, which is currently compensated, due to diastolic dysfunction    Hypertension Evaluation  The patient has a known history of hypertension that is controlled.  Patient's hypertension is most consistent with stage 1.    Heart Rhythm Evaluation  Patient has a history of afib which is paroxysmal and is treated by anticoagulation, The patient has a cardiac implantable electrical device for treatment of sick sinus syndrome. Device is a Pacemaker.  Please consider evaluation of device on day of surgery.    Heart Valve Evaluation  The patient has history of mitral valve repair 2/2018.     Cardiology Evaluation  The patient follows with cardiology, Dr. Ranjan José.  Per note,   Pt is low cardiac risk    Pulmonary  The patient has findings on chart review, clinical presentation and evaluation significant for KULWINDER, asthma. Denies any recent URIs, exacerbations, or hospitalizations. The patient is at increased risk of perioperative pulmonary complications secondary to KULWINDER, advanced age greater than 60, functional dependency, morbid obesity. Patient educated to bring CPAP/BIPAP day of surgery.     RCRI  The patient meets 3 or more RCRI criteria and therefore is at high risk (15%) for major adverse cardiac complications.    METS  The patient's functional capacity is greater than 4 METS.    SHOSHANA score which indicates a 0.2% risk of intraoperative or 30-day postoperative MACE (major adverse cardiac event).    The patient has a stop bang score of 6, which places patient at high risk for having KULWINDER.    ARISCAT 26, Intermediate, 13.3% risk of in-hospital postoperative pulmonary complications    PRODIGY 32, high risk of respiratory depression episode. Patient given PI sheet for preoperative deep breathing exercises.    Caprini score  12, high risk of perioperative VTE.     Eat 10- 0,  self-perceived oropharyngeal dysphagia scale (0-40)     Hematology  The patient has h/o anemia    Antiplatelet management   The patient is currently receiving antiplatelet therapy for CAD, history of CVA/TIA.    Anticoagulation management  The patient is currently receiving anticoagulation therapy for afib.    Patient instructed to ambulate as soon as possible postoperatively to decrease thromboembolic risk. Initiate mechanical DVT prophylaxis as soon as possible and initiate chemical prophylaxis when deemed safe from a bleeding standpoint post surgery.     Transfusion Evaluation  A type and screen was obtained given the likelihood for perioperative transfusion of blood or blood products.    Gastrointestinal  The patient has GERD and GI Bleed    Genitourinary  No diagnoses or significant findings on chart review or clinical presentation and evaluation.    Renal  The patient has a history of chronic kidney disease most consistent with stage 3.  Patient's renal function appears unchanged when compared to prior labs. The patient has specific risk factors associated with increased risk of perioperative renal complications related to age greater than 55, male gender, hypertension, diabetes mellitus, CKD, cerebrovascular disease.    Musculoskeletal  The patient has osteoarthritis and peripheral neuropathy    Endocrine  Diabetes Evaluation  The patient has a history of diabetes mellitus that currently appears controlled.    Thyroid Disease Evaluation  The patient has a history of thyroid disease that appears controlled.    ID  No diagnoses or significant findings on chart review or clinical presentation and evaluation.    -Preoperative medication instructions were provided and reviewed with the patient.  Any additional testing or evaluation was explained to the patient.  NPO Instructions were discussed, and the patient's questions were answered prior to conclusion of this  encounter. Patient verbalized understanding of preoperative instructions. After Visit Summary given.      Recent Results (from the past 48 hours)   Type And Screen Is this order related to pregnancy or an upcoming surgery? Yes; Where will this surgery/delivery be performed? [facility]; What is the date of the surgery? 6/5/2025; Has this patient ever had a transfusion? No; Has th...    Collection Time: 05/29/25 10:01 AM   Result Value Ref Range    ABO TYPE O     Rh TYPE POS     ANTIBODY SCREEN NEG    CBC    Collection Time: 05/29/25 10:01 AM   Result Value Ref Range    WBC 9.0 4.4 - 11.3 x10*3/uL    nRBC 0.0 0.0 - 0.0 /100 WBCs    RBC 4.04 (L) 4.50 - 5.90 x10*6/uL    Hemoglobin 9.2 (L) 13.5 - 17.5 g/dL    Hematocrit 30.2 (L) 41.0 - 52.0 %    MCV 75 (L) 80 - 100 fL    MCH 22.8 (L) 26.0 - 34.0 pg    MCHC 30.5 (L) 32.0 - 36.0 g/dL    RDW 22.9 (H) 11.5 - 14.5 %    Platelets 194 150 - 450 x10*3/uL   Basic Metabolic Panel    Collection Time: 05/29/25 10:01 AM   Result Value Ref Range    Glucose 150 (H) 74 - 99 mg/dL    Sodium 138 136 - 145 mmol/L    Potassium 4.2 3.5 - 5.3 mmol/L    Chloride 99 98 - 107 mmol/L    Bicarbonate 29 21 - 32 mmol/L    Anion Gap 14 10 - 20 mmol/L    Urea Nitrogen 41 (H) 6 - 23 mg/dL    Creatinine 1.67 (H) 0.50 - 1.30 mg/dL    eGFR 43 (L) >60 mL/min/1.73m*2    Calcium 9.1 8.6 - 10.6 mg/dL    Dr. Muller notified of abnormal labs.         [1]   Past Medical History:  Diagnosis Date    A-fib (Multi)     s/p DCCV in 2/2020    Acute on chronic heart failure with preserved ejection fraction (HFpEF) 03/29/2025    Anemia     Arrhythmia     Arthritis     Asthma     Bradycardia     Carpal tunnel syndrome     CHF (congestive heart failure)     Chronic diastolic heart failure    Chronic pain disorder     CKD (chronic kidney disease)     Complete heart block     s/p Medtronic DC PPM placement in 3/2018,    Coronary artery disease     s/p 3 vessel CABG and mitral valve repair in 2/2018    ED  (erectile dysfunction)     GERD (gastroesophageal reflux disease)     GI (gastrointestinal bleed) 2018    in the setting of Xarelto, aspirin, Plavix use    Heart valve disease     s/p mitral valve repair 2/2018    History of blood transfusion     after CABG in 2018    Hyperlipidemia     Hypertension     Hypothyroidism     Mandible fracture (Multi)     Obesity     Palpitations     Peripheral neuropathy     PONV (postoperative nausea and vomiting)     Presence of cardiac pacemaker     Medtronic DC PPM placement in 2/2018    Rectal bleeding     with anticoagulation in 2018    Sleep apnea     uses CPAP    SSS (sick sinus syndrome) (Multi)     Medtronic DC PPM placement in 2/2018    Stroke (Multi) 2002    Testicular cancer (Multi)     Thyroid nodule     Type 2 diabetes mellitus     Vision loss    [2]   Past Surgical History:  Procedure Laterality Date    CARDIAC CATHETERIZATION  04/14/2003    CARDIAC CATHETERIZATION  02/20/2018    CARDIAC CATHETERIZATION  09/30/2021    CARDIOVERSION  02/17/2020    CARDIOVERSION  01/29/2020    CHOLECYSTECTOMY  12/28/2006    COLONOSCOPY W/ BIOPSIES      CORONARY ARTERY BYPASS GRAFT  02/21/2018    HAND SURGERY  04/20/2023    s/p Left hand CTR and Left CMC arthroplasty    INSERT / REPLACE / REMOVE PACEMAKER  03/01/2018    MANDIBLE FRACTURE SURGERY Right 08/01/2024    MITRAL VALVE REPAIR  02/2018    ORCHIECTOMY      ORCHIECTOMY SIMPLE / PARTIAL / RADICAL W/ SCROTAL / INGUINAL APPROACH      ORIF WRIST FRACTURE      TONSILLECTOMY      UPPER GASTROINTESTINAL ENDOSCOPY  04/16/2024    UPPER GI ENDOSCOPY, BIOPSY   [3]   Family History  Problem Relation Name Age of Onset    Heart disease Mother      Hypertension Mother      Cancer Father      Hypertension Sister      Heart disease Brother      Heart attack Brother      Hypertension Brother     [4]   Allergies  Allergen Reactions    Shellfish Containing Products Anaphylaxis     Swelling throat      Adhesive Tape-Silicones Hives     SILK tape     Levaquin [Levofloxacin] Other     Extreme leg pain    Shellfish Derived Nausea/vomiting    Voltaren [Diclofenac Sodium] Other     Elevated blood pressure    Voltaren [Diclofenac Sodium] Other     Elevated BP      Adhesive Tape-Silicones Rash     Rash with silk tape

## 2025-05-30 LAB — STAPHYLOCOCCUS SPEC CULT: NORMAL

## 2025-06-04 ENCOUNTER — OFFICE (OUTPATIENT)
Dept: URBAN - METROPOLITAN AREA CLINIC 15 | Facility: CLINIC | Age: 75
End: 2025-06-04
Payer: MEDICARE

## 2025-06-04 ENCOUNTER — ANESTHESIA EVENT (OUTPATIENT)
Dept: OPERATING ROOM | Facility: HOSPITAL | Age: 75
End: 2025-06-04
Payer: MEDICARE

## 2025-06-04 VITALS
HEART RATE: 70 BPM | OXYGEN SATURATION: 99 % | WEIGHT: 315 LBS | SYSTOLIC BLOOD PRESSURE: 120 MMHG | TEMPERATURE: 98 F | HEIGHT: 70 IN | DIASTOLIC BLOOD PRESSURE: 60 MMHG

## 2025-06-04 DIAGNOSIS — K31.89 OTHER DISEASES OF STOMACH AND DUODENUM: ICD-10-CM

## 2025-06-04 DIAGNOSIS — K64.9 UNSPECIFIED HEMORRHOIDS: ICD-10-CM

## 2025-06-04 DIAGNOSIS — Z86.0100 PERSONAL HISTORY OF COLON POLYPS, UNSPECIFIED: ICD-10-CM

## 2025-06-04 DIAGNOSIS — K44.9 DIAPHRAGMATIC HERNIA WITHOUT OBSTRUCTION OR GANGRENE: ICD-10-CM

## 2025-06-04 DIAGNOSIS — D64.9 ANEMIA, UNSPECIFIED: ICD-10-CM

## 2025-06-04 DIAGNOSIS — K57.90 DIVERTICULOSIS OF INTESTINE, PART UNSPECIFIED, WITHOUT PERFO: ICD-10-CM

## 2025-06-04 DIAGNOSIS — K21.9 GASTRO-ESOPHAGEAL REFLUX DISEASE WITHOUT ESOPHAGITIS: ICD-10-CM

## 2025-06-04 PROCEDURE — 99204 OFFICE O/P NEW MOD 45 MIN: CPT

## 2025-06-05 ENCOUNTER — HOSPITAL ENCOUNTER (INPATIENT)
Facility: HOSPITAL | Age: 75
LOS: 2 days | Discharge: HOME | End: 2025-06-07
Attending: DENTIST | Admitting: DENTIST
Payer: MEDICARE

## 2025-06-05 ENCOUNTER — APPOINTMENT (OUTPATIENT)
Dept: CARDIOLOGY | Facility: HOSPITAL | Age: 75
End: 2025-06-05
Payer: MEDICARE

## 2025-06-05 ENCOUNTER — ANESTHESIA (OUTPATIENT)
Dept: OPERATING ROOM | Facility: HOSPITAL | Age: 75
End: 2025-06-05
Payer: MEDICARE

## 2025-06-05 DIAGNOSIS — Z95.0 PACEMAKER: ICD-10-CM

## 2025-06-05 DIAGNOSIS — M86.9 OSTEOMYELITIS: Primary | ICD-10-CM

## 2025-06-05 DIAGNOSIS — I48.11 LONGSTANDING PERSISTENT ATRIAL FIBRILLATION (MULTI): ICD-10-CM

## 2025-06-05 DIAGNOSIS — M86.9 OSTEOMYELITIS, UNSPECIFIED SITE, UNSPECIFIED TYPE (MULTI): ICD-10-CM

## 2025-06-05 PROBLEM — J45.909 ASTHMA: Status: ACTIVE | Noted: 2025-06-05

## 2025-06-05 PROBLEM — I50.9 CHF (CONGESTIVE HEART FAILURE): Status: ACTIVE | Noted: 2025-06-05

## 2025-06-05 PROBLEM — Z95.1 HISTORY OF CORONARY ARTERY BYPASS GRAFT: Status: ACTIVE | Noted: 2025-06-05

## 2025-06-05 LAB
BODY SURFACE AREA: 2.69 M2
GLUCOSE BLD MANUAL STRIP-MCNC: 124 MG/DL (ref 74–99)
GLUCOSE BLD MANUAL STRIP-MCNC: 126 MG/DL (ref 74–99)
GLUCOSE BLD MANUAL STRIP-MCNC: 189 MG/DL (ref 74–99)
GLUCOSE BLD MANUAL STRIP-MCNC: 260 MG/DL (ref 74–99)
GLUCOSE BLD MANUAL STRIP-MCNC: 270 MG/DL (ref 74–99)

## 2025-06-05 PROCEDURE — 93286 PERI-PX EVAL PM/LDLS PM IP: CPT

## 2025-06-05 PROCEDURE — 2500000001 HC RX 250 WO HCPCS SELF ADMINISTERED DRUGS (ALT 637 FOR MEDICARE OP)

## 2025-06-05 PROCEDURE — 87077 CULTURE AEROBIC IDENTIFY: CPT | Performed by: DENTIST

## 2025-06-05 PROCEDURE — 2500000005 HC RX 250 GENERAL PHARMACY W/O HCPCS: Performed by: DENTIST

## 2025-06-05 PROCEDURE — 3700000001 HC GENERAL ANESTHESIA TIME - INITIAL BASE CHARGE: Performed by: DENTIST

## 2025-06-05 PROCEDURE — 7100000011 HC EXTENDED STAY RECOVERY HOURLY - NURSING UNIT

## 2025-06-05 PROCEDURE — 2500000005 HC RX 250 GENERAL PHARMACY W/O HCPCS: Performed by: ANESTHESIOLOGY

## 2025-06-05 PROCEDURE — C1729 CATH, DRAINAGE: HCPCS | Performed by: DENTIST

## 2025-06-05 PROCEDURE — 87075 CULTR BACTERIA EXCEPT BLOOD: CPT | Performed by: DENTIST

## 2025-06-05 PROCEDURE — 96372 THER/PROPH/DIAG INJ SC/IM: CPT

## 2025-06-05 PROCEDURE — 0NPW04Z REMOVAL OF INTERNAL FIXATION DEVICE FROM FACIAL BONE, OPEN APPROACH: ICD-10-PCS | Performed by: DENTIST

## 2025-06-05 PROCEDURE — 7100000024 HC EXTENDED STAY RECOVERY PER MINUTE- PACU: Performed by: DENTIST

## 2025-06-05 PROCEDURE — 2500000004 HC RX 250 GENERAL PHARMACY W/ HCPCS (ALT 636 FOR OP/ED): Performed by: DENTIST

## 2025-06-05 PROCEDURE — 2500000004 HC RX 250 GENERAL PHARMACY W/ HCPCS (ALT 636 FOR OP/ED): Mod: JZ,TB | Performed by: ANESTHESIOLOGY

## 2025-06-05 PROCEDURE — 4B02XSZ MEASUREMENT OF CARDIAC PACEMAKER, EXTERNAL APPROACH: ICD-10-PCS

## 2025-06-05 PROCEDURE — 2500000004 HC RX 250 GENERAL PHARMACY W/ HCPCS (ALT 636 FOR OP/ED): Mod: JZ,TB

## 2025-06-05 PROCEDURE — 2500000001 HC RX 250 WO HCPCS SELF ADMINISTERED DRUGS (ALT 637 FOR MEDICARE OP): Performed by: ANESTHESIOLOGY

## 2025-06-05 PROCEDURE — 3600000008 HC OR TIME - EACH INCREMENTAL 1 MINUTE - PROCEDURE LEVEL THREE: Performed by: DENTIST

## 2025-06-05 PROCEDURE — 82947 ASSAY GLUCOSE BLOOD QUANT: CPT

## 2025-06-05 PROCEDURE — 87102 FUNGUS ISOLATION CULTURE: CPT | Performed by: DENTIST

## 2025-06-05 PROCEDURE — 3600000003 HC OR TIME - INITIAL BASE CHARGE - PROCEDURE LEVEL THREE: Performed by: DENTIST

## 2025-06-05 PROCEDURE — 2500000002 HC RX 250 W HCPCS SELF ADMINISTERED DRUGS (ALT 637 FOR MEDICARE OP, ALT 636 FOR OP/ED)

## 2025-06-05 PROCEDURE — 2500000004 HC RX 250 GENERAL PHARMACY W/ HCPCS (ALT 636 FOR OP/ED)

## 2025-06-05 PROCEDURE — 7100000002 HC RECOVERY ROOM TIME - EACH INCREMENTAL 1 MINUTE: Performed by: DENTIST

## 2025-06-05 PROCEDURE — 7100000001 HC RECOVERY ROOM TIME - INITIAL BASE CHARGE: Performed by: DENTIST

## 2025-06-05 PROCEDURE — 2720000007 HC OR 272 NO HCPCS: Performed by: DENTIST

## 2025-06-05 PROCEDURE — 3700000002 HC GENERAL ANESTHESIA TIME - EACH INCREMENTAL 1 MINUTE: Performed by: DENTIST

## 2025-06-05 RX ORDER — MIDAZOLAM HYDROCHLORIDE 1 MG/ML
INJECTION INTRAMUSCULAR; INTRAVENOUS AS NEEDED
Status: DISCONTINUED | OUTPATIENT
Start: 2025-06-05 | End: 2025-06-05

## 2025-06-05 RX ORDER — OXYCODONE HYDROCHLORIDE 5 MG/1
5 TABLET ORAL EVERY 4 HOURS PRN
Status: DISCONTINUED | OUTPATIENT
Start: 2025-06-05 | End: 2025-06-05 | Stop reason: HOSPADM

## 2025-06-05 RX ORDER — ONDANSETRON HYDROCHLORIDE 2 MG/ML
4 INJECTION, SOLUTION INTRAVENOUS ONCE AS NEEDED
Status: DISCONTINUED | OUTPATIENT
Start: 2025-06-05 | End: 2025-06-05 | Stop reason: HOSPADM

## 2025-06-05 RX ORDER — SODIUM CHLORIDE 0.9 G/100ML
INJECTION, SOLUTION IRRIGATION AS NEEDED
Status: DISCONTINUED | OUTPATIENT
Start: 2025-06-05 | End: 2025-06-05 | Stop reason: HOSPADM

## 2025-06-05 RX ORDER — INSULIN LISPRO 100 [IU]/ML
0-10 INJECTION, SOLUTION INTRAVENOUS; SUBCUTANEOUS
Status: DISCONTINUED | OUTPATIENT
Start: 2025-06-06 | End: 2025-06-05

## 2025-06-05 RX ORDER — SODIUM CHLORIDE, SODIUM LACTATE, POTASSIUM CHLORIDE, CALCIUM CHLORIDE 600; 310; 30; 20 MG/100ML; MG/100ML; MG/100ML; MG/100ML
50 INJECTION, SOLUTION INTRAVENOUS CONTINUOUS
Status: DISCONTINUED | OUTPATIENT
Start: 2025-06-05 | End: 2025-06-05

## 2025-06-05 RX ORDER — CHLORHEXIDINE GLUCONATE ORAL RINSE 1.2 MG/ML
15 SOLUTION DENTAL 3 TIMES DAILY
Status: DISCONTINUED | OUTPATIENT
Start: 2025-06-05 | End: 2025-06-07 | Stop reason: HOSPADM

## 2025-06-05 RX ORDER — BACITRACIN 500 [USP'U]/G
OINTMENT TOPICAL AS NEEDED
Status: DISCONTINUED | OUTPATIENT
Start: 2025-06-05 | End: 2025-06-05 | Stop reason: HOSPADM

## 2025-06-05 RX ORDER — DEXTROSE 50 % IN WATER (D50W) INTRAVENOUS SYRINGE
25
Status: DISCONTINUED | OUTPATIENT
Start: 2025-06-05 | End: 2025-06-06 | Stop reason: SDUPTHER

## 2025-06-05 RX ORDER — OXYCODONE HYDROCHLORIDE 5 MG/1
10 TABLET ORAL EVERY 4 HOURS PRN
Refills: 0 | Status: DISCONTINUED | OUTPATIENT
Start: 2025-06-05 | End: 2025-06-07 | Stop reason: HOSPADM

## 2025-06-05 RX ORDER — CEFAZOLIN 1 G/1
INJECTION, POWDER, FOR SOLUTION INTRAVENOUS AS NEEDED
Status: DISCONTINUED | OUTPATIENT
Start: 2025-06-05 | End: 2025-06-05

## 2025-06-05 RX ORDER — OXYCODONE HYDROCHLORIDE 5 MG/1
5 TABLET ORAL EVERY 6 HOURS PRN
Refills: 0 | Status: DISCONTINUED | OUTPATIENT
Start: 2025-06-05 | End: 2025-06-07 | Stop reason: HOSPADM

## 2025-06-05 RX ORDER — DEXTROSE 50 % IN WATER (D50W) INTRAVENOUS SYRINGE
12.5
Status: DISCONTINUED | OUTPATIENT
Start: 2025-06-05 | End: 2025-06-06 | Stop reason: SDUPTHER

## 2025-06-05 RX ORDER — LIDOCAINE HYDROCHLORIDE AND EPINEPHRINE 10; 10 UG/ML; MG/ML
INJECTION, SOLUTION INFILTRATION; PERINEURAL AS NEEDED
Status: DISCONTINUED | OUTPATIENT
Start: 2025-06-05 | End: 2025-06-05 | Stop reason: HOSPADM

## 2025-06-05 RX ORDER — SUCCINYLCHOLINE CHLORIDE 20 MG/ML
INJECTION INTRAMUSCULAR; INTRAVENOUS AS NEEDED
Status: DISCONTINUED | OUTPATIENT
Start: 2025-06-05 | End: 2025-06-05

## 2025-06-05 RX ORDER — ENOXAPARIN SODIUM 100 MG/ML
40 INJECTION SUBCUTANEOUS EVERY 12 HOURS SCHEDULED
Status: DISCONTINUED | OUTPATIENT
Start: 2025-06-05 | End: 2025-06-07 | Stop reason: HOSPADM

## 2025-06-05 RX ORDER — REMIFENTANIL HYDROCHLORIDE 1 MG/ML
INJECTION, POWDER, LYOPHILIZED, FOR SOLUTION INTRAVENOUS CONTINUOUS PRN
Status: DISCONTINUED | OUTPATIENT
Start: 2025-06-05 | End: 2025-06-05

## 2025-06-05 RX ORDER — ACETAMINOPHEN 325 MG/1
650 TABLET ORAL EVERY 6 HOURS
Status: DISCONTINUED | OUTPATIENT
Start: 2025-06-05 | End: 2025-06-07 | Stop reason: HOSPADM

## 2025-06-05 RX ORDER — HYDROMORPHONE HYDROCHLORIDE 1 MG/ML
INJECTION, SOLUTION INTRAMUSCULAR; INTRAVENOUS; SUBCUTANEOUS AS NEEDED
Status: DISCONTINUED | OUTPATIENT
Start: 2025-06-05 | End: 2025-06-05

## 2025-06-05 RX ORDER — HYDROMORPHONE HYDROCHLORIDE 0.2 MG/ML
0.2 INJECTION INTRAMUSCULAR; INTRAVENOUS; SUBCUTANEOUS EVERY 5 MIN PRN
Status: DISCONTINUED | OUTPATIENT
Start: 2025-06-05 | End: 2025-06-05 | Stop reason: HOSPADM

## 2025-06-05 RX ORDER — ONDANSETRON HYDROCHLORIDE 2 MG/ML
INJECTION, SOLUTION INTRAVENOUS AS NEEDED
Status: DISCONTINUED | OUTPATIENT
Start: 2025-06-05 | End: 2025-06-05

## 2025-06-05 RX ORDER — INSULIN LISPRO 100 [IU]/ML
0-10 INJECTION, SOLUTION INTRAVENOUS; SUBCUTANEOUS
Status: DISCONTINUED | OUTPATIENT
Start: 2025-06-05 | End: 2025-06-06

## 2025-06-05 RX ORDER — ACETAMINOPHEN 160 MG/5ML
650 SOLUTION ORAL EVERY 6 HOURS
Status: DISCONTINUED | OUTPATIENT
Start: 2025-06-05 | End: 2025-06-07 | Stop reason: HOSPADM

## 2025-06-05 RX ORDER — PROPOFOL 10 MG/ML
INJECTION, EMULSION INTRAVENOUS AS NEEDED
Status: DISCONTINUED | OUTPATIENT
Start: 2025-06-05 | End: 2025-06-05

## 2025-06-05 RX ORDER — LIDOCAINE HYDROCHLORIDE 10 MG/ML
0.1 INJECTION, SOLUTION INFILTRATION; PERINEURAL ONCE
Status: DISCONTINUED | OUTPATIENT
Start: 2025-06-05 | End: 2025-06-05 | Stop reason: HOSPADM

## 2025-06-05 RX ORDER — SODIUM CHLORIDE, SODIUM LACTATE, POTASSIUM CHLORIDE, CALCIUM CHLORIDE 600; 310; 30; 20 MG/100ML; MG/100ML; MG/100ML; MG/100ML
75 INJECTION, SOLUTION INTRAVENOUS CONTINUOUS
Status: ACTIVE | OUTPATIENT
Start: 2025-06-05 | End: 2025-06-05

## 2025-06-05 RX ORDER — DROPERIDOL 2.5 MG/ML
0.62 INJECTION, SOLUTION INTRAMUSCULAR; INTRAVENOUS ONCE AS NEEDED
Status: DISCONTINUED | OUTPATIENT
Start: 2025-06-05 | End: 2025-06-05 | Stop reason: HOSPADM

## 2025-06-05 RX ORDER — MEPERIDINE HYDROCHLORIDE 25 MG/ML
12.5 INJECTION INTRAMUSCULAR; INTRAVENOUS; SUBCUTANEOUS EVERY 10 MIN PRN
Status: DISCONTINUED | OUTPATIENT
Start: 2025-06-05 | End: 2025-06-05 | Stop reason: HOSPADM

## 2025-06-05 RX ORDER — LIDOCAINE HCL/PF 100 MG/5ML
SYRINGE (ML) INTRAVENOUS AS NEEDED
Status: DISCONTINUED | OUTPATIENT
Start: 2025-06-05 | End: 2025-06-05

## 2025-06-05 RX ADMIN — CHLORHEXIDINE GLUCONATE, 0.12% ORAL RINSE 15 ML: 1.2 SOLUTION DENTAL at 21:01

## 2025-06-05 RX ADMIN — HYDROMORPHONE HYDROCHLORIDE 0.5 MG: 1 INJECTION, SOLUTION INTRAMUSCULAR; INTRAVENOUS; SUBCUTANEOUS at 09:23

## 2025-06-05 RX ADMIN — CEFAZOLIN 3 G: 1 INJECTION, POWDER, FOR SOLUTION INTRAMUSCULAR; INTRAVENOUS at 07:39

## 2025-06-05 RX ADMIN — AMPICILLIN SODIUM AND SULBACTAM SODIUM 3 G: 2; 1 INJECTION, POWDER, FOR SOLUTION INTRAMUSCULAR; INTRAVENOUS at 18:24

## 2025-06-05 RX ADMIN — ACETAMINOPHEN 650 MG: 325 TABLET ORAL at 21:01

## 2025-06-05 RX ADMIN — LIDOCAINE HYDROCHLORIDE 50 MG: 20 INJECTION INTRAVENOUS at 09:01

## 2025-06-05 RX ADMIN — PROPOFOL 200 MG: 10 INJECTION, EMULSION INTRAVENOUS at 07:28

## 2025-06-05 RX ADMIN — LIDOCAINE HYDROCHLORIDE 50 MG: 20 INJECTION INTRAVENOUS at 07:45

## 2025-06-05 RX ADMIN — HYDROMORPHONE HYDROCHLORIDE 0.5 MG: 1 INJECTION, SOLUTION INTRAMUSCULAR; INTRAVENOUS; SUBCUTANEOUS at 13:37

## 2025-06-05 RX ADMIN — OXYCODONE 5 MG: 5 TABLET ORAL at 10:56

## 2025-06-05 RX ADMIN — Medication 0.1 L/MIN: at 18:08

## 2025-06-05 RX ADMIN — AMPICILLIN SODIUM AND SULBACTAM SODIUM 3 G: 2; 1 INJECTION, POWDER, FOR SOLUTION INTRAMUSCULAR; INTRAVENOUS at 12:21

## 2025-06-05 RX ADMIN — REMIFENTANIL HYDROCHLORIDE 0.06 MCG/KG/MIN: 1 INJECTION, POWDER, LYOPHILIZED, FOR SOLUTION INTRAVENOUS at 07:36

## 2025-06-05 RX ADMIN — AMPICILLIN SODIUM AND SULBACTAM SODIUM 3 G: 2; 1 INJECTION, POWDER, FOR SOLUTION INTRAMUSCULAR; INTRAVENOUS at 23:01

## 2025-06-05 RX ADMIN — INSULIN HUMAN 1 UNITS: 100 INJECTION, SOLUTION PARENTERAL at 18:36

## 2025-06-05 RX ADMIN — ONDANSETRON 4 MG: 2 INJECTION, SOLUTION INTRAMUSCULAR; INTRAVENOUS at 09:02

## 2025-06-05 RX ADMIN — MIDAZOLAM HYDROCHLORIDE 2 MG: 2 INJECTION, SOLUTION INTRAMUSCULAR; INTRAVENOUS at 07:28

## 2025-06-05 RX ADMIN — SUCCINYLCHOLINE CHLORIDE 100 MG: 20 INJECTION, SOLUTION INTRAMUSCULAR; INTRAVENOUS at 07:28

## 2025-06-05 RX ADMIN — DEXAMETHASONE SODIUM PHOSPHATE 10 MG: 4 INJECTION, SOLUTION INTRA-ARTICULAR; INTRALESIONAL; INTRAMUSCULAR; INTRAVENOUS; SOFT TISSUE at 07:45

## 2025-06-05 RX ADMIN — INSULIN LISPRO 6 UNITS: 100 INJECTION, SOLUTION INTRAVENOUS; SUBCUTANEOUS at 23:00

## 2025-06-05 RX ADMIN — ENOXAPARIN SODIUM 40 MG: 100 INJECTION SUBCUTANEOUS at 21:01

## 2025-06-05 RX ADMIN — HYDROMORPHONE HYDROCHLORIDE 0.5 MG: 1 INJECTION, SOLUTION INTRAMUSCULAR; INTRAVENOUS; SUBCUTANEOUS at 09:02

## 2025-06-05 RX ADMIN — PROPOFOL 25 MCG/KG/MIN: 10 INJECTION, EMULSION INTRAVENOUS at 07:52

## 2025-06-05 SDOH — ECONOMIC STABILITY: FOOD INSECURITY: HOW HARD IS IT FOR YOU TO PAY FOR THE VERY BASICS LIKE FOOD, HOUSING, MEDICAL CARE, AND HEATING?: NOT HARD AT ALL

## 2025-06-05 SDOH — SOCIAL STABILITY: SOCIAL INSECURITY: WITHIN THE LAST YEAR, HAVE YOU BEEN AFRAID OF YOUR PARTNER OR EX-PARTNER?: NO

## 2025-06-05 SDOH — ECONOMIC STABILITY: FOOD INSECURITY: WITHIN THE PAST 12 MONTHS, THE FOOD YOU BOUGHT JUST DIDN'T LAST AND YOU DIDN'T HAVE MONEY TO GET MORE.: NEVER TRUE

## 2025-06-05 SDOH — ECONOMIC STABILITY: HOUSING INSECURITY: IN THE PAST 12 MONTHS, HOW MANY TIMES HAVE YOU MOVED WHERE YOU WERE LIVING?: 1

## 2025-06-05 SDOH — SOCIAL STABILITY: SOCIAL INSECURITY: DO YOU FEEL ANYONE HAS EXPLOITED OR TAKEN ADVANTAGE OF YOU FINANCIALLY OR OF YOUR PERSONAL PROPERTY?: NO

## 2025-06-05 SDOH — SOCIAL STABILITY: SOCIAL NETWORK: HOW OFTEN DO YOU ATTEND CHURCH OR RELIGIOUS SERVICES?: NEVER

## 2025-06-05 SDOH — ECONOMIC STABILITY: FOOD INSECURITY: WITHIN THE PAST 12 MONTHS, YOU WORRIED THAT YOUR FOOD WOULD RUN OUT BEFORE YOU GOT THE MONEY TO BUY MORE.: NEVER TRUE

## 2025-06-05 SDOH — ECONOMIC STABILITY: HOUSING INSECURITY: IN THE LAST 12 MONTHS, WAS THERE A TIME WHEN YOU WERE NOT ABLE TO PAY THE MORTGAGE OR RENT ON TIME?: NO

## 2025-06-05 SDOH — SOCIAL STABILITY: SOCIAL INSECURITY: ARE YOU MARRIED, WIDOWED, DIVORCED, SEPARATED, NEVER MARRIED, OR LIVING WITH A PARTNER?: MARRIED

## 2025-06-05 SDOH — SOCIAL STABILITY: SOCIAL NETWORK: HOW OFTEN DO YOU GET TOGETHER WITH FRIENDS OR RELATIVES?: NEVER

## 2025-06-05 SDOH — ECONOMIC STABILITY: INCOME INSECURITY: IN THE PAST 12 MONTHS HAS THE ELECTRIC, GAS, OIL, OR WATER COMPANY THREATENED TO SHUT OFF SERVICES IN YOUR HOME?: NO

## 2025-06-05 SDOH — SOCIAL STABILITY: SOCIAL NETWORK
DO YOU BELONG TO ANY CLUBS OR ORGANIZATIONS SUCH AS CHURCH GROUPS, UNIONS, FRATERNAL OR ATHLETIC GROUPS, OR SCHOOL GROUPS?: YES

## 2025-06-05 SDOH — HEALTH STABILITY: MENTAL HEALTH
DO YOU FEEL STRESS - TENSE, RESTLESS, NERVOUS, OR ANXIOUS, OR UNABLE TO SLEEP AT NIGHT BECAUSE YOUR MIND IS TROUBLED ALL THE TIME - THESE DAYS?: NOT AT ALL

## 2025-06-05 SDOH — ECONOMIC STABILITY: HOUSING INSECURITY: DO YOU FEEL UNSAFE GOING BACK TO THE PLACE WHERE YOU LIVE?: NO

## 2025-06-05 SDOH — SOCIAL STABILITY: SOCIAL NETWORK: IN A TYPICAL WEEK, HOW MANY TIMES DO YOU TALK ON THE PHONE WITH FAMILY, FRIENDS, OR NEIGHBORS?: NEVER

## 2025-06-05 SDOH — SOCIAL STABILITY: SOCIAL INSECURITY
WITHIN THE LAST YEAR, HAVE YOU BEEN KICKED, HIT, SLAPPED, OR OTHERWISE PHYSICALLY HURT BY YOUR PARTNER OR EX-PARTNER?: NO

## 2025-06-05 SDOH — SOCIAL STABILITY: SOCIAL INSECURITY
WITHIN THE LAST YEAR, HAVE YOU BEEN RAPED OR FORCED TO HAVE ANY KIND OF SEXUAL ACTIVITY BY YOUR PARTNER OR EX-PARTNER?: NO

## 2025-06-05 SDOH — SOCIAL STABILITY: SOCIAL INSECURITY: WITHIN THE LAST YEAR, HAVE YOU BEEN HUMILIATED OR EMOTIONALLY ABUSED IN OTHER WAYS BY YOUR PARTNER OR EX-PARTNER?: NO

## 2025-06-05 SDOH — ECONOMIC STABILITY: TRANSPORTATION INSECURITY: IN THE PAST 12 MONTHS, HAS LACK OF TRANSPORTATION KEPT YOU FROM MEDICAL APPOINTMENTS OR FROM GETTING MEDICATIONS?: NO

## 2025-06-05 SDOH — SOCIAL STABILITY: SOCIAL INSECURITY: WERE YOU ABLE TO COMPLETE ALL THE BEHAVIORAL HEALTH SCREENINGS?: YES

## 2025-06-05 SDOH — SOCIAL STABILITY: SOCIAL INSECURITY: HAVE YOU HAD ANY THOUGHTS OF HARMING ANYONE ELSE?: NO

## 2025-06-05 SDOH — ECONOMIC STABILITY: HOUSING INSECURITY: AT ANY TIME IN THE PAST 12 MONTHS, WERE YOU HOMELESS OR LIVING IN A SHELTER (INCLUDING NOW)?: NO

## 2025-06-05 SDOH — SOCIAL STABILITY: SOCIAL NETWORK: HOW OFTEN DO YOU ATTEND MEETINGS OF THE CLUBS OR ORGANIZATIONS YOU BELONG TO?: MORE THAN 4 TIMES PER YEAR

## 2025-06-05 SDOH — SOCIAL STABILITY: SOCIAL INSECURITY: DO YOU FEEL UNSAFE GOING BACK TO THE PLACE WHERE YOU ARE LIVING?: NO

## 2025-06-05 SDOH — SOCIAL STABILITY: SOCIAL INSECURITY: HAVE YOU HAD THOUGHTS OF HARMING ANYONE ELSE?: YES

## 2025-06-05 SDOH — SOCIAL STABILITY: SOCIAL INSECURITY: HAS ANYONE EVER THREATENED TO HURT YOUR FAMILY OR YOUR PETS?: NO

## 2025-06-05 SDOH — SOCIAL STABILITY: SOCIAL INSECURITY: ARE YOU OR HAVE YOU BEEN THREATENED OR ABUSED PHYSICALLY, EMOTIONALLY, OR SEXUALLY BY ANYONE?: NO

## 2025-06-05 SDOH — SOCIAL STABILITY: SOCIAL INSECURITY: ABUSE: ADULT

## 2025-06-05 SDOH — SOCIAL STABILITY: SOCIAL INSECURITY
ASK PARENT OR GUARDIAN: ARE THERE TIMES WHEN YOU, YOUR CHILD(REN), OR ANY MEMBER OF YOUR HOUSEHOLD FEEL UNSAFE, HARMED, OR THREATENED AROUND PERSONS WITH WHOM YOU KNOW OR LIVE?: NO

## 2025-06-05 SDOH — SOCIAL STABILITY: SOCIAL INSECURITY: DOES ANYONE TRY TO KEEP YOU FROM HAVING/CONTACTING OTHER FRIENDS OR DOING THINGS OUTSIDE YOUR HOME?: NO

## 2025-06-05 SDOH — SOCIAL STABILITY: SOCIAL INSECURITY: ARE THERE ANY APPARENT SIGNS OF INJURIES/BEHAVIORS THAT COULD BE RELATED TO ABUSE/NEGLECT?: NO

## 2025-06-05 ASSESSMENT — COGNITIVE AND FUNCTIONAL STATUS - GENERAL
DRESSING REGULAR UPPER BODY CLOTHING: A LITTLE
DAILY ACTIVITIY SCORE: 20
DRESSING REGULAR LOWER BODY CLOTHING: A LITTLE
WALKING IN HOSPITAL ROOM: A LITTLE
DRESSING REGULAR UPPER BODY CLOTHING: A LITTLE
EATING MEALS: A LITTLE
PERSONAL GROOMING: A LITTLE
HELP NEEDED FOR BATHING: A LITTLE
STANDING UP FROM CHAIR USING ARMS: A LITTLE
MOBILITY SCORE: 18
TOILETING: A LITTLE
CLIMB 3 TO 5 STEPS WITH RAILING: A LITTLE
TOILETING: A LITTLE
PERSONAL GROOMING: A LITTLE
DRESSING REGULAR LOWER BODY CLOTHING: A LITTLE
MOBILITY SCORE: 23
TOILETING: A LITTLE
DRESSING REGULAR LOWER BODY CLOTHING: A LITTLE
TURNING FROM BACK TO SIDE WHILE IN FLAT BAD: A LITTLE
DAILY ACTIVITIY SCORE: 18
MOBILITY SCORE: 24
STANDING UP FROM CHAIR USING ARMS: A LITTLE
MOVING TO AND FROM BED TO CHAIR: A LITTLE
PATIENT BASELINE BEDBOUND: NO
MOVING FROM LYING ON BACK TO SITTING ON SIDE OF FLAT BED WITH BEDRAILS: A LITTLE
DRESSING REGULAR UPPER BODY CLOTHING: A LITTLE
DAILY ACTIVITIY SCORE: 20
PERSONAL GROOMING: A LITTLE

## 2025-06-05 ASSESSMENT — PAIN - FUNCTIONAL ASSESSMENT
PAIN_FUNCTIONAL_ASSESSMENT: 0-10
PAIN_FUNCTIONAL_ASSESSMENT: UNABLE TO SELF-REPORT
PAIN_FUNCTIONAL_ASSESSMENT: 0-10
PAIN_FUNCTIONAL_ASSESSMENT: UNABLE TO SELF-REPORT
PAIN_FUNCTIONAL_ASSESSMENT: 0-10
PAIN_FUNCTIONAL_ASSESSMENT: 0-10
PAIN_FUNCTIONAL_ASSESSMENT: UNABLE TO SELF-REPORT
PAIN_FUNCTIONAL_ASSESSMENT: 0-10
PAIN_FUNCTIONAL_ASSESSMENT: 0-10

## 2025-06-05 ASSESSMENT — PATIENT HEALTH QUESTIONNAIRE - PHQ9
2. FEELING DOWN, DEPRESSED OR HOPELESS: NOT AT ALL
SUM OF ALL RESPONSES TO PHQ9 QUESTIONS 1 & 2: 0
1. LITTLE INTEREST OR PLEASURE IN DOING THINGS: NOT AT ALL

## 2025-06-05 ASSESSMENT — PAIN SCALES - GENERAL
PAINLEVEL_OUTOF10: 0 - NO PAIN
PAINLEVEL_OUTOF10: 0 - NO PAIN
PAINLEVEL_OUTOF10: 3
PAINLEVEL_OUTOF10: 7
PAINLEVEL_OUTOF10: 4
PAINLEVEL_OUTOF10: 3
PAINLEVEL_OUTOF10: 1
PAINLEVEL_OUTOF10: 2
PAINLEVEL_OUTOF10: 7
PAINLEVEL_OUTOF10: 3
PAINLEVEL_OUTOF10: 4
PAINLEVEL_OUTOF10: 2
PAINLEVEL_OUTOF10: 4
PAINLEVEL_OUTOF10: 1
PAINLEVEL_OUTOF10: 4
PAINLEVEL_OUTOF10: 0 - NO PAIN
PAINLEVEL_OUTOF10: 2
PAINLEVEL_OUTOF10: 0 - NO PAIN
PAINLEVEL_OUTOF10: 3
PAINLEVEL_OUTOF10: 3
PAINLEVEL_OUTOF10: 2
PAINLEVEL_OUTOF10: 2
PAINLEVEL_OUTOF10: 4
PAINLEVEL_OUTOF10: 5 - MODERATE PAIN
PAINLEVEL_OUTOF10: 4

## 2025-06-05 ASSESSMENT — ACTIVITIES OF DAILY LIVING (ADL)
ADEQUATE_TO_COMPLETE_ADL: YES
PATIENT'S MEMORY ADEQUATE TO SAFELY COMPLETE DAILY ACTIVITIES?: YES
FEEDING YOURSELF: INDEPENDENT
LACK_OF_TRANSPORTATION: NO
JUDGMENT_ADEQUATE_SAFELY_COMPLETE_DAILY_ACTIVITIES: YES
GROOMING: INDEPENDENT
BATHING: INDEPENDENT
DRESSING YOURSELF: INDEPENDENT
TOILETING: INDEPENDENT
HEARING - LEFT EAR: FUNCTIONAL
LACK_OF_TRANSPORTATION: NO
WALKS IN HOME: INDEPENDENT
HEARING - RIGHT EAR: FUNCTIONAL

## 2025-06-05 ASSESSMENT — ENCOUNTER SYMPTOMS
APPETITE CHANGE: 0
PALPITATIONS: 0
SORE THROAT: 0
FEVER: 0
CHEST TIGHTNESS: 0
FACIAL SWELLING: 1
COUGH: 0
TROUBLE SWALLOWING: 0
NAUSEA: 0
CHILLS: 0
SHORTNESS OF BREATH: 0

## 2025-06-05 ASSESSMENT — LIFESTYLE VARIABLES
SKIP TO QUESTIONS 9-10: 1
HOW OFTEN DO YOU HAVE A DRINK CONTAINING ALCOHOL: NEVER
SUBSTANCE_ABUSE_PAST_12_MONTHS: NO
AUDIT-C TOTAL SCORE: 0
HOW MANY STANDARD DRINKS CONTAINING ALCOHOL DO YOU HAVE ON A TYPICAL DAY: PATIENT DOES NOT DRINK
HOW OFTEN DO YOU HAVE 6 OR MORE DRINKS ON ONE OCCASION: NEVER
AUDIT-C TOTAL SCORE: 0
PRESCIPTION_ABUSE_PAST_12_MONTHS: NO

## 2025-06-05 ASSESSMENT — COLUMBIA-SUICIDE SEVERITY RATING SCALE - C-SSRS
2. HAVE YOU ACTUALLY HAD ANY THOUGHTS OF KILLING YOURSELF?: NO
6. HAVE YOU EVER DONE ANYTHING, STARTED TO DO ANYTHING, OR PREPARED TO DO ANYTHING TO END YOUR LIFE?: NO
1. IN THE PAST MONTH, HAVE YOU WISHED YOU WERE DEAD OR WISHED YOU COULD GO TO SLEEP AND NOT WAKE UP?: NO

## 2025-06-05 NOTE — ANESTHESIA PREPROCEDURE EVALUATION
Patient: Masoud Barger    Procedure Information       Date/Time: 06/05/25 0700    Procedures:       RECONSTRUCTION, MANDIBLE (Right: Face)      REMOVAL, HARDWARE, FACE (Right: Face)    Location: [facility] OR  / [facility]    Surgeons: Ruel Muller DDS            Relevant Problems   Cardiac   (+) CHF (congestive heart failure)   (+) History of coronary artery bypass graft   (+) Longstanding persistent atrial fibrillation (Multi)   (+) Pacemaker      Pulmonary   (+) Asthma      HEENT  Osteomyelititis mandible       Clinical information reviewed:   Tobacco  Allergies  Meds   Med Hx  Surg Hx   Fam Hx  Soc Hx        NPO Detail:  NPO/Void Status  Carbohydrate Drink Given Prior to Surgery? : N  Date of Last Liquid: 06/04/25  Time of Last Liquid: 2300  Date of Last Solid: 06/04/25  Time of Last Solid: 2300  Last Intake Type: Clear fluids  Time of Last Void: 0553         PHYSICAL EXAM    Anesthesia Plan    History of general anesthesia?: yes  History of complications of general anesthesia?: no    ASA 3     general     intravenous induction   Postoperative pain plan includes opioids.  Trial extubation is planned.  Anesthetic plan and risks discussed with patient.    Plan discussed with CAA.

## 2025-06-05 NOTE — ANESTHESIA PROCEDURE NOTES
Airway  Date/Time: 6/5/2025 7:32 AM  Reason: elective    Airway not difficult    Staffing  Performed: CAA and CELINE   Authorized by: Tyrone Stephenson MD    Performed by: NENA Llanes  Patient location during procedure: OR    Patient Condition  Indications for airway management: anesthesia  Patient position: sniffing  MILS maintained throughout  Sedation level: deep     Final Airway Details  Preoxygenated: no  Final airway type: endotracheal airway  Successful airway: ETT and TYLER tube  Cuffed: yes   Successful intubation technique: video laryngoscopy  Adjuncts used in placement: Magill forceps  Endotracheal tube insertion site: right naris  Blade: Fang (glide)  Blade size: #4  ETT size (mm): 7.0  Cormack-Lehane Classification: grade I - full view of glottis  Placement verified by: chest auscultation and capnometry   Measured from: nares  ETT to nares (cm): 27  Number of attempts at approach: 1    Additional Comments  Atraumatic intubation, dentition in tact. Nasal tyler lubed and put in warm saline

## 2025-06-05 NOTE — ANESTHESIA POSTPROCEDURE EVALUATION
Patient: Masoud Barger    Procedure Summary       Date: 06/05/25 Room / Location: [facility] OR 06 / [facility] [place] OR    Anesthesia Start: 0722 Anesthesia Stop: 0922    Procedure: REMOVAL, HARDWARE, FACE/ REMOVAL OF BONE MANIBLE (Right: Face) Diagnosis:       Osteomyelitis, unspecified site, unspecified type (Multi)      (Osteomyelitis, unspecified site, unspecified type (Multi) [M86.9])    Surgeons: Ruel Muller DDS Responsible Provider: Tyrone Stephenson MD    Anesthesia Type: general ASA Status: 3            Anesthesia Type: general    Vitals Value Taken Time   /65 06/05/25 09:30   Temp 36.5 °C (97.7 °F) 06/05/25 09:17   Pulse 62 06/05/25 09:39   Resp 11 06/05/25 09:39   SpO2 97 % 06/05/25 09:39   Vitals shown include unfiled device data.    Anesthesia Post Evaluation    Patient location during evaluation: PACU  Patient participation: complete - patient participated  Level of consciousness: awake  Pain management: adequate  Airway patency: patent  Cardiovascular status: acceptable  Respiratory status: acceptable  Hydration status: acceptable  Postoperative Nausea and Vomiting: none        No notable events documented.

## 2025-06-05 NOTE — H&P
History Of Present Illness  Masoud Barger is a 74 y.o. male presenting with right mandibular pain. Patient previously had tooth #32 extracted in July 2024 complicated by pathological fracture repaired with ORIF of the right mandible. Subsequent healing course has been slow and complicated with intra oral granulation tissue and a persistent pain and ache of the right mandible. Further workup reveals a likely presentation of chronic osteomyelitis and failed hardware following initial procedure. Patient continues to report a dull achy pain the right mandible but denies any current purulence or drainage at this time.      Past Medical History  Medical History[1]    Surgical History  Surgical History[2]     Social History  He reports that he has never smoked. He has never used smokeless tobacco. He reports that he does not currently use alcohol. He reports that he does not use drugs.    Family History  Family History[3]     Allergies  Shellfish containing products, Adhesive tape-silicones, Levaquin [levofloxacin], Shellfish derived, Voltaren [diclofenac sodium], Voltaren [diclofenac sodium], and Adhesive tape-silicones    Review of Systems  Negative other than mentioned in HPI.      Physical Exam  Constitutional: AAOX3, resting comfortably in bed  NEURO: Alert and oriented x3, no gross motor or sensory deficits.  PULM: Breathing comfortably on RA  GI: Abd soft, nontender, nondistended,  Skin: Warm and dry. No rashes or lesions noted.  Eyes: PERRL, EOMI. clear sclera  Head:  Right CN V 3 hypoesthesia  Otherwise CN V and VII intact and equal b/l  No gross facial swelling  No extra oral fistula appreciated  Neck:  Soft to palpation b/l  Well healed scar on right neck  Mouth:  JALEEL 40 mm  Site #32 with raised nodule of granulation tissue - TTP, erythema noted  FOM soft non elevated b/l  Upper complete denture  Heavily restored mandibular dentition  No intraoral drainage or purulence noted  Extremities: SMITH  PSYCH:  "Appropriate mood and behavior      Last Recorded Vitals  Blood pressure 164/72, pulse 82, temperature 36.6 °C (97.9 °F), temperature source Temporal, resp. rate 16, height 1.778 m (5' 10\"), weight 146 kg (322 lb 6.4 oz), SpO2 100%.    Relevant Results  CT Facial Bones:  IMPRESSION:  Status post internal fixation of right mandibular fracture. Lucency  around the 4 most anterior screws and associated plate with  surrounding soft tissue inflammatory changes. Constellation of  findings are concerning for infection. No definite drainable fluid  collections.     Assessment & Plan    Masoud Barger is a 74 y.o. male presenting with right mandibular pain - likely failed hardware in the setting of chronic osteomyelitis. Plan is to remove right mandibular hardware, debride right mandible, placement of new hardware in the OR with Dr. Muller.      Gerhard Dodd, DMD         [1]   Past Medical History:  Diagnosis Date    A-fib (Multi)     s/p DCCV in 2/2020    Acute on chronic heart failure with preserved ejection fraction (HFpEF) 03/29/2025    Anemia     Arrhythmia     Arthritis     Asthma     Bradycardia     Carpal tunnel syndrome     Cataract     Cerebral vascular accident (Multi)     2002    CHF (congestive heart failure)     Chronic diastolic heart failure    Chronic pain disorder     CKD (chronic kidney disease)     stage 3    Complete heart block     s/p Medtronic DC PPM placement in 3/2018,    Coronary artery disease     s/p 3 vessel CABG and mitral valve repair in 2/2018    ED (erectile dysfunction)     GERD (gastroesophageal reflux disease)     GI (gastrointestinal bleed) 2018    in the setting of Xarelto, aspirin, Plavix use    Heart valve disease     s/p mitral valve repair 2/2018    History of blood transfusion     after CABG in 2018    Hyperlipidemia     Hypertension     Hypothyroidism     Mandible fracture (Multi)     Obesity     Palpitations     Peripheral neuropathy     PONV (postoperative nausea and vomiting)  "    Presence of cardiac pacemaker     Medtronic DC PPM placement in 2/2018    Rectal bleeding     with anticoagulation in 2018    Sleep apnea     uses CPAP    SSS (sick sinus syndrome) (Multi)     Medtronic DC PPM placement in 2/2018    Stroke (Multi) 2002    Testicular cancer (Multi)     Thyroid nodule     Type 2 diabetes mellitus     Vision loss    [2]   Past Surgical History:  Procedure Laterality Date    CARDIAC CATHETERIZATION  04/14/2003    CARDIAC CATHETERIZATION  02/20/2018    CARDIAC CATHETERIZATION  09/30/2021    CARDIOVERSION  02/17/2020    CARDIOVERSION  01/29/2020    CHOLECYSTECTOMY  12/28/2006    COLONOSCOPY W/ BIOPSIES      CORONARY ARTERY BYPASS GRAFT  02/21/2018    HAND SURGERY  04/20/2023    s/p Left hand CTR and Left CMC arthroplasty    INSERT / REPLACE / REMOVE PACEMAKER  03/01/2018    MANDIBLE FRACTURE SURGERY Right 08/01/2024    MITRAL VALVE REPAIR  02/2018    ORCHIECTOMY      ORCHIECTOMY SIMPLE / PARTIAL / RADICAL W/ SCROTAL / INGUINAL APPROACH      ORIF WRIST FRACTURE      TONSILLECTOMY      UPPER GASTROINTESTINAL ENDOSCOPY  04/16/2024    UPPER GI ENDOSCOPY, BIOPSY   [3]   Family History  Problem Relation Name Age of Onset    Heart disease Mother      Hypertension Mother      Cancer Father      Hypertension Sister      Heart disease Brother      Heart attack Brother      Hypertension Brother

## 2025-06-05 NOTE — SIGNIFICANT EVENT
Patient is a 73 y/o M presenting POD 0 HOD 0 s/p  removal of right mandibular hardware, debridement of bone and placement of penrose drain in the OR.    Patient initially presented to us for extraction of deeply impacted #32 with subsequent pathological fracture and repair (08/24). Patient has presented to fu with compromised hardware requiring removal and debridement of bone. Patient diagnosed with CHF, a-fib, hx of stroke (2002), placement of pacemaker, DMII, CKD and hypothyroidism.       Post operative course complicated with increased right mandible and neck swelling. Post operatively patient reports he is feel well with no difficulty breathing or trouble swallowing. Denies any palpation, dizziness, racing heart or SOB. He reports pain is well controlled but aggravated by neck movements. He is tolerating PO fluids, has not ambulated, urinated and denies BM at this time.     Visit Vitals  /63 (BP Location: Left arm, Patient Position: Lying)   Pulse 65   Temp 36.5 °C (97.7 °F)   Resp 11     Review of Systems   Constitutional:  Negative for appetite change, chills and fever.   HENT:  Positive for facial swelling. Negative for drooling, mouth sores, sore throat and trouble swallowing.    Respiratory:  Negative for cough, chest tightness and shortness of breath.    Cardiovascular:  Negative for palpitations.   Gastrointestinal:  Negative for nausea.      Physical Exam  Constitutional:       Comments: Patient resting in bed comfortably. Consuming broth without difficulty.    HENT:      Head:      Comments: Patient right sided swelling, swelling is soft and focus along the lateral aspect of right mandible. Swelling extends slightly submandibularly- inferior border of the mandible is palpable mostly at angle and anterior mandible.  Firm palpation - body of R mandible palpable. Neck is soft, with eccymosis. Neck, right mandible and incisions are tender. NAVARRO drain is in place with bleeding, guaze w moderate  strikethrough.       Right marginal mandibular weakness - correlate as edema subsides.  CNV intact b.l with preserved directionality 5/5.      Nose: Nose normal.      Mouth/Throat:      Mouth: Mucous membranes are moist.      Comments: Intraorally, occlusion is stable- patient has heavily restored dentition, anterior crossbite. Fom soft and non tender. Uveale is midline.   Eyes:      Conjunctiva/sclera: Conjunctivae normal.   Cardiovascular:      Comments: Patient warm - SCDs in place.   Pulmonary:      Comments: Patient breathing room air without assistance.   Skin:     General: Skin is warm.   Neurological:      Mental Status: He is alert.     Assessment and Plan:    Patient is a 75 y/o M presenting POD 0 HOD 0 s/p  removal of right mandibular hardware, debridement of bone and placement of penrose drain in the OR. Patient pain is well controlled. Mr. Barger has right neck swelling that appears to be stable - focus of swelling is lateral right mandible with slight extension submandibularly. Plan is to monitor patient 20:00, 22:00, 24:00 for changes or progression. Patient is aware to look out for signs of closing airway or if swelling appears to enlarge. Kerlix dressing in place.     Plan:    Consults   - Inpatient consultation to medicine. HF exacerbation - 1m ago requiring diuresis - fluid, HF and medication recommendations while IP   -CBC/RPF ordered     Neuro:  - Tylenol 650mg q6h scheduled  - Oxycodone 5/10mg q4h PRN moderate/severe pain  - Dilaudid .2mg breakthrough pain     OMFS:  - 60cc syringe and red rubber catheter at bedside  - Elevate head of bed 30 degrees  - Strictly monitor drain output  - Peridex mouth rinse TID    CV:  #Afib, HTN, CHF   - Hold Asprin, eliquis, lasix,    - Hold fluids per IM  -Vitals q2hr     Respiratory:  - Strict SPO2 monitoring   - Nasal cannula 2L to maintain sats >92%  - Supplemental O2 must be humidified    GI:  - Full liquid diet  - Zofran PRN     #DMII:  - Hold Trulicity      DVT ppx:  - Lovenox 40mg subcutaneous  - SCD’s  - Encourage OOB to chair and ambulation    [place] OMFS at [phone] with any issues/concerns.     If any issue with contacting via pager, please contact Bertrand Spann via Haiku.   2nd call to contact via Haiku is Ryan Spann,  DMD    OMFS PGY-1

## 2025-06-05 NOTE — ANESTHESIA PROCEDURE NOTES
Peripheral IV  Date/Time: 6/5/2025 7:38 AM  Inserted by: NENA Llanes    Placement  Needle size: 18 G  Laterality: left  Location: hand  Local anesthetic: none  Site prep: alcohol  Technique: anatomical landmarks  Attempts: 1

## 2025-06-06 ENCOUNTER — OFFICE VISIT (OUTPATIENT)
Dept: SURGICAL ONCOLOGY | Facility: HOSPITAL | Age: 75
End: 2025-06-06
Payer: MEDICARE

## 2025-06-06 ENCOUNTER — APPOINTMENT (OUTPATIENT)
Dept: RADIOLOGY | Facility: HOSPITAL | Age: 75
End: 2025-06-06
Payer: MEDICARE

## 2025-06-06 PROBLEM — M86.9 OSTEOMYELITIS: Status: ACTIVE | Noted: 2025-06-06

## 2025-06-06 LAB
ALBUMIN SERPL BCP-MCNC: 4 G/DL (ref 3.4–5)
ALBUMIN SERPL BCP-MCNC: 4.1 G/DL (ref 3.4–5)
ANION GAP SERPL CALC-SCNC: 15 MMOL/L (ref 10–20)
ANION GAP SERPL CALC-SCNC: 16 MMOL/L (ref 10–20)
BUN SERPL-MCNC: 43 MG/DL (ref 6–23)
BUN SERPL-MCNC: 47 MG/DL (ref 6–23)
CALCIUM SERPL-MCNC: 9.2 MG/DL (ref 8.6–10.6)
CALCIUM SERPL-MCNC: 9.5 MG/DL (ref 8.6–10.6)
CHLORIDE SERPL-SCNC: 95 MMOL/L (ref 98–107)
CHLORIDE SERPL-SCNC: 98 MMOL/L (ref 98–107)
CO2 SERPL-SCNC: 25 MMOL/L (ref 21–32)
CO2 SERPL-SCNC: 27 MMOL/L (ref 21–32)
CREAT SERPL-MCNC: 1.43 MG/DL (ref 0.5–1.3)
CREAT SERPL-MCNC: 1.58 MG/DL (ref 0.5–1.3)
EGFRCR SERPLBLD CKD-EPI 2021: 46 ML/MIN/1.73M*2
EGFRCR SERPLBLD CKD-EPI 2021: 52 ML/MIN/1.73M*2
ERYTHROCYTE [DISTWIDTH] IN BLOOD BY AUTOMATED COUNT: 21.3 % (ref 11.5–14.5)
EST. AVERAGE GLUCOSE BLD GHB EST-MCNC: 146 MG/DL
GLUCOSE BLD MANUAL STRIP-MCNC: 179 MG/DL (ref 74–99)
GLUCOSE BLD MANUAL STRIP-MCNC: 210 MG/DL (ref 74–99)
GLUCOSE BLD MANUAL STRIP-MCNC: 228 MG/DL (ref 74–99)
GLUCOSE BLD MANUAL STRIP-MCNC: 231 MG/DL (ref 74–99)
GLUCOSE BLD MANUAL STRIP-MCNC: 236 MG/DL (ref 74–99)
GLUCOSE BLD MANUAL STRIP-MCNC: 293 MG/DL (ref 74–99)
GLUCOSE BLD MANUAL STRIP-MCNC: 315 MG/DL (ref 74–99)
GLUCOSE SERPL-MCNC: 181 MG/DL (ref 74–99)
GLUCOSE SERPL-MCNC: 301 MG/DL (ref 74–99)
HBA1C MFR BLD: 6.7 % (ref ?–5.7)
HCT VFR BLD AUTO: 28.3 % (ref 41–52)
HGB BLD-MCNC: 9.2 G/DL (ref 13.5–17.5)
MAGNESIUM SERPL-MCNC: 2.06 MG/DL (ref 1.6–2.4)
MCH RBC QN AUTO: 23.2 PG (ref 26–34)
MCHC RBC AUTO-ENTMCNC: 32.5 G/DL (ref 32–36)
MCV RBC AUTO: 72 FL (ref 80–100)
NRBC BLD-RTO: 0 /100 WBCS (ref 0–0)
PHOSPHATE SERPL-MCNC: 3.5 MG/DL (ref 2.5–4.9)
PHOSPHATE SERPL-MCNC: 4.1 MG/DL (ref 2.5–4.9)
PLATELET # BLD AUTO: 161 X10*3/UL (ref 150–450)
POTASSIUM SERPL-SCNC: 4.6 MMOL/L (ref 3.5–5.3)
POTASSIUM SERPL-SCNC: 4.7 MMOL/L (ref 3.5–5.3)
RBC # BLD AUTO: 3.96 X10*6/UL (ref 4.5–5.9)
SODIUM SERPL-SCNC: 131 MMOL/L (ref 136–145)
SODIUM SERPL-SCNC: 135 MMOL/L (ref 136–145)
WBC # BLD AUTO: 11.7 X10*3/UL (ref 4.4–11.3)

## 2025-06-06 PROCEDURE — 2500000004 HC RX 250 GENERAL PHARMACY W/ HCPCS (ALT 636 FOR OP/ED)

## 2025-06-06 PROCEDURE — 83735 ASSAY OF MAGNESIUM: CPT

## 2025-06-06 PROCEDURE — 80069 RENAL FUNCTION PANEL: CPT

## 2025-06-06 PROCEDURE — 2500000001 HC RX 250 WO HCPCS SELF ADMINISTERED DRUGS (ALT 637 FOR MEDICARE OP)

## 2025-06-06 PROCEDURE — 70355 PANORAMIC X-RAY OF JAWS: CPT | Performed by: STUDENT IN AN ORGANIZED HEALTH CARE EDUCATION/TRAINING PROGRAM

## 2025-06-06 PROCEDURE — 2500000002 HC RX 250 W HCPCS SELF ADMINISTERED DRUGS (ALT 637 FOR MEDICARE OP, ALT 636 FOR OP/ED)

## 2025-06-06 PROCEDURE — 93005 ELECTROCARDIOGRAM TRACING: CPT

## 2025-06-06 PROCEDURE — 96372 THER/PROPH/DIAG INJ SC/IM: CPT

## 2025-06-06 PROCEDURE — 36415 COLL VENOUS BLD VENIPUNCTURE: CPT

## 2025-06-06 PROCEDURE — 83036 HEMOGLOBIN GLYCOSYLATED A1C: CPT

## 2025-06-06 PROCEDURE — 1170000001 HC PRIVATE ONCOLOGY ROOM DAILY

## 2025-06-06 PROCEDURE — 2500000002 HC RX 250 W HCPCS SELF ADMINISTERED DRUGS (ALT 637 FOR MEDICARE OP, ALT 636 FOR OP/ED): Performed by: DENTIST

## 2025-06-06 PROCEDURE — 70355 PANORAMIC X-RAY OF JAWS: CPT

## 2025-06-06 PROCEDURE — 85027 COMPLETE CBC AUTOMATED: CPT

## 2025-06-06 PROCEDURE — 82947 ASSAY GLUCOSE BLOOD QUANT: CPT

## 2025-06-06 PROCEDURE — 99222 1ST HOSP IP/OBS MODERATE 55: CPT

## 2025-06-06 PROCEDURE — 2500000001 HC RX 250 WO HCPCS SELF ADMINISTERED DRUGS (ALT 637 FOR MEDICARE OP): Performed by: DENTIST

## 2025-06-06 RX ORDER — SPIRONOLACTONE 25 MG/1
12.5 TABLET ORAL DAILY
COMMUNITY

## 2025-06-06 RX ORDER — GABAPENTIN 300 MG/1
300 CAPSULE ORAL 2 TIMES DAILY
Status: DISCONTINUED | OUTPATIENT
Start: 2025-06-06 | End: 2025-06-07 | Stop reason: HOSPADM

## 2025-06-06 RX ORDER — DEXTROSE 50 % IN WATER (D50W) INTRAVENOUS SYRINGE
25
Status: DISCONTINUED | OUTPATIENT
Start: 2025-06-06 | End: 2025-06-06 | Stop reason: SDUPTHER

## 2025-06-06 RX ORDER — ISOSORBIDE MONONITRATE 30 MG/1
30 TABLET, EXTENDED RELEASE ORAL DAILY
COMMUNITY

## 2025-06-06 RX ORDER — LEVOTHYROXINE SODIUM 25 UG/1
25 TABLET ORAL
Status: DISCONTINUED | OUTPATIENT
Start: 2025-06-07 | End: 2025-06-07 | Stop reason: HOSPADM

## 2025-06-06 RX ORDER — INSULIN LISPRO 100 [IU]/ML
0-15 INJECTION, SOLUTION INTRAVENOUS; SUBCUTANEOUS
Status: DISCONTINUED | OUTPATIENT
Start: 2025-06-06 | End: 2025-06-06

## 2025-06-06 RX ORDER — ISOSORBIDE MONONITRATE 30 MG/1
30 TABLET, EXTENDED RELEASE ORAL DAILY
Status: DISCONTINUED | OUTPATIENT
Start: 2025-06-06 | End: 2025-06-07 | Stop reason: HOSPADM

## 2025-06-06 RX ORDER — INSULIN LISPRO 100 [IU]/ML
0-20 INJECTION, SOLUTION INTRAVENOUS; SUBCUTANEOUS
Status: DISCONTINUED | OUTPATIENT
Start: 2025-06-06 | End: 2025-06-06

## 2025-06-06 RX ORDER — HYDRALAZINE HYDROCHLORIDE 25 MG/1
25 TABLET, FILM COATED ORAL EVERY 8 HOURS
Status: DISCONTINUED | OUTPATIENT
Start: 2025-06-06 | End: 2025-06-07 | Stop reason: HOSPADM

## 2025-06-06 RX ORDER — INSULIN GLARGINE 100 [IU]/ML
20 INJECTION, SOLUTION SUBCUTANEOUS ONCE
Status: COMPLETED | OUTPATIENT
Start: 2025-06-06 | End: 2025-06-06

## 2025-06-06 RX ORDER — INSULIN LISPRO 100 [IU]/ML
0-20 INJECTION, SOLUTION INTRAVENOUS; SUBCUTANEOUS
Status: DISCONTINUED | OUTPATIENT
Start: 2025-06-06 | End: 2025-06-07 | Stop reason: HOSPADM

## 2025-06-06 RX ORDER — INSULIN GLARGINE 100 [IU]/ML
20 INJECTION, SOLUTION SUBCUTANEOUS DAILY
Status: DISCONTINUED | OUTPATIENT
Start: 2025-06-07 | End: 2025-06-07 | Stop reason: HOSPADM

## 2025-06-06 RX ORDER — PANTOPRAZOLE SODIUM 40 MG/1
40 TABLET, DELAYED RELEASE ORAL
Status: DISCONTINUED | OUTPATIENT
Start: 2025-06-07 | End: 2025-06-07 | Stop reason: HOSPADM

## 2025-06-06 RX ORDER — DEXTROSE 50 % IN WATER (D50W) INTRAVENOUS SYRINGE
25
Status: DISCONTINUED | OUTPATIENT
Start: 2025-06-06 | End: 2025-06-07 | Stop reason: HOSPADM

## 2025-06-06 RX ORDER — ATORVASTATIN CALCIUM 40 MG/1
40 TABLET, FILM COATED ORAL NIGHTLY
Status: DISCONTINUED | OUTPATIENT
Start: 2025-06-06 | End: 2025-06-07 | Stop reason: HOSPADM

## 2025-06-06 RX ORDER — FUROSEMIDE 40 MG/1
80 TABLET ORAL ONCE
Status: COMPLETED | OUTPATIENT
Start: 2025-06-06 | End: 2025-06-06

## 2025-06-06 RX ORDER — DEXTROSE 50 % IN WATER (D50W) INTRAVENOUS SYRINGE
12.5
Status: DISCONTINUED | OUTPATIENT
Start: 2025-06-06 | End: 2025-06-07 | Stop reason: HOSPADM

## 2025-06-06 RX ORDER — HYDRALAZINE HYDROCHLORIDE 25 MG/1
25 TABLET, FILM COATED ORAL 3 TIMES DAILY
COMMUNITY

## 2025-06-06 RX ORDER — OMEPRAZOLE 40 MG/1
40 CAPSULE, DELAYED RELEASE ORAL
COMMUNITY

## 2025-06-06 RX ORDER — DEXTROSE 50 % IN WATER (D50W) INTRAVENOUS SYRINGE
12.5
Status: DISCONTINUED | OUTPATIENT
Start: 2025-06-06 | End: 2025-06-06 | Stop reason: SDUPTHER

## 2025-06-06 RX ORDER — FAMOTIDINE 40 MG/1
40 TABLET, FILM COATED ORAL NIGHTLY
Status: DISCONTINUED | OUTPATIENT
Start: 2025-06-06 | End: 2025-06-07 | Stop reason: HOSPADM

## 2025-06-06 RX ORDER — FERROUS SULFATE 325(65) MG
325 TABLET, DELAYED RELEASE (ENTERIC COATED) ORAL DAILY
COMMUNITY

## 2025-06-06 RX ADMIN — DEXAMETHASONE SODIUM PHOSPHATE 8 MG: 4 INJECTION, SOLUTION INTRA-ARTICULAR; INTRALESIONAL; INTRAMUSCULAR; INTRAVENOUS; SOFT TISSUE at 07:44

## 2025-06-06 RX ADMIN — GABAPENTIN 300 MG: 300 CAPSULE ORAL at 09:30

## 2025-06-06 RX ADMIN — INSULIN LISPRO 4 UNITS: 100 INJECTION, SOLUTION INTRAVENOUS; SUBCUTANEOUS at 20:43

## 2025-06-06 RX ADMIN — CHLORHEXIDINE GLUCONATE, 0.12% ORAL RINSE 15 ML: 1.2 SOLUTION DENTAL at 20:30

## 2025-06-06 RX ADMIN — ATORVASTATIN CALCIUM 40 MG: 40 TABLET, FILM COATED ORAL at 20:30

## 2025-06-06 RX ADMIN — INSULIN GLARGINE 20 UNITS: 100 INJECTION, SOLUTION SUBCUTANEOUS at 11:33

## 2025-06-06 RX ADMIN — AMPICILLIN SODIUM AND SULBACTAM SODIUM 3 G: 2; 1 INJECTION, POWDER, FOR SOLUTION INTRAMUSCULAR; INTRAVENOUS at 20:30

## 2025-06-06 RX ADMIN — INSULIN LISPRO 16 UNITS: 100 INJECTION, SOLUTION INTRAVENOUS; SUBCUTANEOUS at 16:36

## 2025-06-06 RX ADMIN — FAMOTIDINE 40 MG: 40 TABLET, FILM COATED ORAL at 20:30

## 2025-06-06 RX ADMIN — ISOSORBIDE MONONITRATE 30 MG: 30 TABLET, EXTENDED RELEASE ORAL at 14:16

## 2025-06-06 RX ADMIN — ENOXAPARIN SODIUM 40 MG: 100 INJECTION SUBCUTANEOUS at 09:30

## 2025-06-06 RX ADMIN — HYDRALAZINE HYDROCHLORIDE 25 MG: 25 TABLET ORAL at 16:36

## 2025-06-06 RX ADMIN — AMPICILLIN SODIUM AND SULBACTAM SODIUM 3 G: 2; 1 INJECTION, POWDER, FOR SOLUTION INTRAMUSCULAR; INTRAVENOUS at 07:45

## 2025-06-06 RX ADMIN — INSULIN LISPRO 8 UNITS: 100 INJECTION, SOLUTION INTRAVENOUS; SUBCUTANEOUS at 11:52

## 2025-06-06 RX ADMIN — ACETAMINOPHEN 650 MG: 325 TABLET ORAL at 20:32

## 2025-06-06 RX ADMIN — CHLORHEXIDINE GLUCONATE, 0.12% ORAL RINSE 15 ML: 1.2 SOLUTION DENTAL at 09:30

## 2025-06-06 RX ADMIN — GABAPENTIN 300 MG: 300 CAPSULE ORAL at 20:31

## 2025-06-06 RX ADMIN — AMPICILLIN SODIUM AND SULBACTAM SODIUM 3 G: 2; 1 INJECTION, POWDER, FOR SOLUTION INTRAMUSCULAR; INTRAVENOUS at 14:16

## 2025-06-06 RX ADMIN — HYDRALAZINE HYDROCHLORIDE 25 MG: 25 TABLET ORAL at 10:01

## 2025-06-06 RX ADMIN — INSULIN LISPRO 4 UNITS: 100 INJECTION, SOLUTION INTRAVENOUS; SUBCUTANEOUS at 09:30

## 2025-06-06 RX ADMIN — INSULIN HUMAN 20 UNITS: 100 INJECTION, SUSPENSION SUBCUTANEOUS at 16:35

## 2025-06-06 RX ADMIN — ENOXAPARIN SODIUM 40 MG: 100 INJECTION SUBCUTANEOUS at 20:31

## 2025-06-06 RX ADMIN — FUROSEMIDE 80 MG: 40 TABLET ORAL at 10:01

## 2025-06-06 ASSESSMENT — COGNITIVE AND FUNCTIONAL STATUS - GENERAL
MOBILITY SCORE: 24
MOBILITY SCORE: 24
DAILY ACTIVITIY SCORE: 24
DAILY ACTIVITIY SCORE: 24

## 2025-06-06 ASSESSMENT — ENCOUNTER SYMPTOMS
FACIAL SWELLING: 1
SHORTNESS OF BREATH: 0
CHEST TIGHTNESS: 0
FEVER: 0
CHILLS: 0
COUGH: 0
TROUBLE SWALLOWING: 0
SORE THROAT: 0
CHOKING: 0
STRIDOR: 0

## 2025-06-06 ASSESSMENT — PAIN SCALES - GENERAL
PAINLEVEL_OUTOF10: 0 - NO PAIN

## 2025-06-06 ASSESSMENT — PAIN - FUNCTIONAL ASSESSMENT
PAIN_FUNCTIONAL_ASSESSMENT: 0-10

## 2025-06-06 NOTE — SIGNIFICANT EVENT
Patient is 75 y/o M presenting POD 0 HOD 0 s/p removal of right mandibular hardware, R debridement and placement of R neck penrose drain in OR 6/5. Healing course complicated with right surgical site swelling. Patient was evaluated at 2216 and 0027. Patient reports no changes between 2 visits. He reports no difficulty breathing, no trouble swallowing. Subjectively, he reports swelling feels the same, has not moved or gotten larger. He reports some of the swelling he had intraorally has decreased.     Visit Vitals  /60 (BP Location: Left arm, Patient Position: Lying)   Pulse 60   Temp 36.8 °C (98.2 °F) (Temporal)   Resp 16       Review of Systems   Constitutional:  Negative for chills and fever.   HENT:  Positive for facial swelling. Negative for sore throat and trouble swallowing.    Respiratory:  Negative for cough, choking, chest tightness, shortness of breath and stridor.    Cardiovascular:  Negative for chest pain.        Physical Exam  Constitutional:       Comments: Patient awake and resting in bed.    HENT:      Head:      Comments: Patient right sided swelling that is soft - swelling is located along the lateral mandible - slightly more edematous compared to baseline - inferior border of the mandible still appreciated upon palpation. R neck soft but tender, ecchymosis present posterior to drain- same size and distribution compared to baseline. NAVARRO drain in place. Minimal output and strikethrough.      Nose: Nose normal.      Mouth/Throat:      Mouth: Mucous membranes are moist.      Comments: Intraorally, Uveale is midline. FOM is soft and non tender. Tongue is not elevated.   Eyes:      Conjunctiva/sclera: Conjunctivae normal.   Pulmonary:      Comments: Patient breathing comfortably, not SOB while talking, no stridors or wheezing noted  Skin:     General: Skin is warm.   Neurological:      Mental Status: He is alert.        Results for orders placed or performed during the hospital encounter of  06/05/25 (from the past 24 hours)   POCT GLUCOSE   Result Value Ref Range    POCT Glucose 126 (H) 74 - 99 mg/dL   Cardiac device check - Surgery   Result Value Ref Range    BSA 2.69 m2   POCT GLUCOSE   Result Value Ref Range    POCT Glucose 124 (H) 74 - 99 mg/dL   POCT GLUCOSE   Result Value Ref Range    POCT Glucose 189 (H) 74 - 99 mg/dL   POCT GLUCOSE   Result Value Ref Range    POCT Glucose 260 (H) 74 - 99 mg/dL   POCT GLUCOSE   Result Value Ref Range    POCT Glucose 270 (H) 74 - 99 mg/dL   POCT GLUCOSE   Result Value Ref Range    POCT Glucose 228 (H) 74 - 99 mg/dL      Patient is 73 y/o M presenting POD 0 HOD 0 s/p removal of right mandibular hardware, R debridement and placement of R neck penrose drain in OR 6/5.     #Right sided facial swelling: Swelling appears to be stable compared to baseline and post operative check. Patient is hemodynamically stable and without signs and symptoms of respiratory distress or obstruction. Plan is to continue FU q2hr.       #Hyperglycemia: Patient Transitioned from corrective scale Lispro #2. Patient was given 10 units of insulin 2221. BG moved from 270 - 228. Patient given 5 additional units insulin 0040. Patient transitioned from Corrective scale #2 to #3. Plan is to take scheduled  POCT 1 hour.       **   Addendum (7133)   Evaluated patient. Subjectively he denies - increased swelling and pressure around head wrap, swelling extending to neck, difficulty breathing, trouble swallowing. He denies any wheezing, coughs or chest congestion. Denies any palpations, racing heart or chest pains. He reports pitting edema and leg swelling occurs at home. He reports 80mg of Lasix once a day. Denies any current leg pain.      Visit Vitals  /76 (BP Location: Left arm, Patient Position: Lying)   Pulse 64   Temp 36 °C (96.8 °F) (Temporal)   Resp 16      On evaluation: Kerlix left in place. Inferior border of mandible palpable. Neck soft. Intraorally, FOM soft, uvulae midline. On  palpation of stomach quadrants no TTP. SCDs removed, b/l pitting edema R>L. On auscultation of lung lobes - appear clear without wheezing, rochi and rales. Upon auscultation of neck- no wheezing or stridor noted. Homans sign (-) b/l.     Patient POC glucose monitor shows: 170.     # Right sided facial swelling. Exam findings and subjective findings appear consistent with previous examination. Continue q2 monitoring.     #Hyperglycemia:  POCT Glucose 1 hour. Assess for hyperglycemia     #CHF: Verbally consulted NACR. If patient has no acute  CHF symtpoms, is saturating well and hemodynamically stable, no concern for DVT at this time. AM labs (BMP, CBC, MAG, RPF) should be taken and evaluated prior to restating home lasix. Plan: Labs ordered STAT

## 2025-06-06 NOTE — CARE PLAN
Problem: Heart Failure  Goal: Improved gas exchange this shift  Outcome: Progressing  Goal: Improved urinary output this shift  Outcome: Progressing  Goal: Reduction in peripheral edema within 24 hours  Outcome: Progressing  Goal: Report improvement of dyspnea/breathlessness this shift  Outcome: Progressing  Goal: Weight from fluid excess reduced over 2-3 days, then stabilize  Outcome: Progressing  Goal: Increase self care and/or family involvement in 24 hours  Outcome: Progressing     Problem: Pain - Adult  Goal: Verbalizes/displays adequate comfort level or baseline comfort level  Outcome: Progressing     Problem: Safety - Adult  Goal: Free from fall injury  Outcome: Progressing     Problem: Discharge Planning  Goal: Discharge to home or other facility with appropriate resources  Outcome: Progressing     Problem: Chronic Conditions and Co-morbidities  Goal: Patient's chronic conditions and co-morbidity symptoms are monitored and maintained or improved  Outcome: Progressing     Problem: Nutrition  Goal: Nutrient intake appropriate for maintaining nutritional needs  Outcome: Progressing

## 2025-06-06 NOTE — PROGRESS NOTES
73 yo M Hospital Day 2, POD 1, s/p removal of R mandibular infected hardware and debridement via transcervical approach, doing well.    Subjective: PT reports minimal pain (2/2), denies difficulty breathing or swallowing. Reports urinating and passing gas. Tolerating full liquid diet.     Objective: PT resting comfortably in bed. Mild R CNVII marginal mandibular branch weakness consistent with surgery. Mild R CNV3 hypoesthesia in lip that was present prior to 2nd surgery and is improving. R neck drain putting out minimal heme. Moderate R neck swelling that is mildly firm consistent with surgery, can appreciate entire inferior boarder of mandible. FOM soft and nonelevated    Assessment: 73 yo M Hospital Day 2, POD 1, s/p removal of R mandibular infected hardware and debridement via transcervical approach, doing well.    Plan:     Consults   - Inpatient consultation to medicine. HF exacerbation - 1m ago requiring diuresis - fluid, HF and medication recommendations while IP     Neuro:  - Tylenol 650mg q6h scheduled  - Oxycodone 5/10mg q4h PRN moderate/severe pain  - Gabapentin 300mg BID scheduled     OMFS:  - 60cc syringe and red rubber catheter at bedside  - Elevate head of bed 30 degrees  - Strictly monitor drain output  - Peridex mouth rinse TID  - Full liquid diabetic diet  - 8mg decadron IV for management of swelling (given AM)  - Unasyn 3g q6hr for 3 doses     CV:  #Afib, HTN, CHF   - Hold Asprin, eliquis until discharge  - Per medicine: restart PO lasix 80mg once daily, PO hydralizine 25mg q8, atorvastatin 40mg once daily  - Repeat RFP in PM to assess creatinine    Respiratory:  - Strict SPO2 monitoring   - Nasal cannula 2L to maintain sats >92%  - Supplemental O2 must be humidified     GI:  - Full liquid diet  - Zofran PRN   - Per medicine: restart PO famotidine 40mg once daily, PO pantoprazole 40mg once daily before breakfast    Hypothyroidism:  - Per medicine: restart PO levothyroxine 25mg once daily      #DMII:  - Hold Trulicity   - lantus 20U prior to meals per medicine     DVT ppx:  - Lovenox 40mg subcutaneous  - SCD’s  - Encourage OOB to chair and ambulation     Please [place] OMFS at [phone] with any issues/concerns.        Courtney Salmeron DDS  FS PGY-2  Team Pager: [phone]  Available on Nipendo    2nd call to contact via Haiku is Ryan Yap

## 2025-06-06 NOTE — PROGRESS NOTES
Pharmacy Medication History Review    Masoud Barger is a 74 y.o. male admitted for Longstanding persistent atrial fibrillation (Multi). Pharmacy reviewed the patient's iooia-er-iieatwdcd medications and allergies for accuracy.    Medications ADDED:  Ferrous sulfate, hydralazine, isosorbide, omeprazole, spironolactone, elderberry  Medications CHANGED:  Potassium, zingc, toujeo, omega 3, humalog, glucosamine, furosemide, tylenol  Medications REMOVED:   Nasonex, nexium     The list below reflects the updated PTA list.   Prior to Admission Medications   Prescriptions Last Dose Informant   ELDERBERRY FRUIT ORAL  Self   Sig: Take 1 tablet by mouth once daily.   Eliquis 2.5 mg tablet Past Week Self   Sig: Take 1 tablet (2.5 mg) by mouth every 12 hours.   Gvoke HypoPen 2-Pack 1 mg/0.2 mL auto-injector  Self   Sig: if needed.   HumaLOG KwikPen Insulin 100 unit/mL injection 6/4/2025 Self   Sig: Inject under the skin if needed. 5 units with breakfast, 10 units with lunch, 10 units with dinner + additional sliding scale coverage with each meal if needed   POTASSIUM PO  Self   Sig: Take 1 tablet by mouth once daily. OTC potassium supplement   TURMERIC ORAL Unknown Self   Toujeo SoloStar U-300 Insulin 300 unit/mL (1.5 mL) injection 6/4/2025 Self   Sig: Inject 75 Units under the skin once daily in the morning.   Trulicity 3 mg/0.5 mL pen injector Past Month Self   Sig: Inject 3 mg as directed 1 (one) time per week. On Saturdays   ZINC ACETATE ORAL Past Week Self   Sig: Take 50 mg by mouth once daily.   acetaminophen (Tylenol Extra Strength) 500 mg tablet 6/4/2025 Self   Sig: Take 2 tablets (1,000 mg) by mouth every 8 hours if needed.   ascorbic acid (Vitamin C) 500 mg tablet Past Week Self   Sig: Take 1 tablet (500 mg) by mouth once daily.   aspirin 81 mg capsule 6/4/2025 Self   Sig: Take 81 mg by mouth once daily.   atorvastatin (Lipitor) 40 mg tablet 6/4/2025 Self   Sig: Take 1 tablet (40 mg) by mouth once daily at bedtime.    cetirizine (ZyrTEC) 10 mg tablet 6/4/2025 Self   Sig: Take 1 tablet (10 mg) by mouth once daily.   famotidine (Pepcid) 40 mg tablet 6/4/2025 Self   Sig: Take 1 tablet (40 mg) by mouth once daily at bedtime.   ferrous sulfate 325 (65 Fe) mg EC tablet  Self   Sig: Take 1 tablet by mouth once daily. Do not crush, chew, or split.   furosemide (Lasix) 80 mg tablet 6/4/2025 Self   Sig: Take 1 tablet (80 mg) by mouth once daily in the morning.   gabapentin (Neurontin) 300 mg capsule 6/4/2025 Self   Sig: Take 1 capsule (300 mg) by mouth every 12 hours.   glucos sul 2KCl/msm/chond/C/Mn (GLUCOSAMINE CHONDROITIN ORAL)  Self   Sig: Take 1 tablet by mouth 2 times a day. 1500mg-1200mg   hydrALAZINE (Apresoline) 25 mg tablet  Self   Sig: Take 1 tablet (25 mg) by mouth 3 times a day.   isosorbide mononitrate ER (Imdur) 30 mg 24 hr tablet  Self   Sig: Take 1 tablet (30 mg) by mouth once daily. Do not crush or chew.   levothyroxine (Synthroid, Levoxyl) 25 mcg tablet 6/4/2025 Self   Sig: Take 1 tablet (25 mcg) by mouth once daily in the morning. Take before meals.   magnesium oxide (Mag-Ox) 400 mg (241.3 mg elemental) tablet Past Week Self   Sig: Take 1 tablet by mouth once daily.   melatonin 1 mg tablet,chewable 6/4/2025 Self   Sig: Chew 1 mg as needed at bedtime.   omega 3-dha-epa-fish oil (Fish OiL) 1,000 (120-180) mg capsule Past Month Self   Sig: Take 1 capsule (1,000 mg) by mouth 2 times a day.   omeprazole (PriLOSEC) 40 mg DR capsule  Self   Sig: Take 1 capsule (40 mg) by mouth once daily in the morning. Take before meals. Do not crush or chew.   spironolactone (Aldactone) 25 mg tablet  Self   Sig: Take 0.5 tablets (12.5 mg) by mouth once daily.      Facility-Administered Medications: None          Patient declines M2B at discharge.     Sources:   [facility]  Pharmacy dispense history  Patient Interview good historian, had updated list on his phone  Chart Review  Care Everywhere  Cardiology office visit note /med list with [facility] on  "5/9/25  Reviewed [facility] care everywhere active med list (patient states he sees cardiologist there)    Additional Comments:  None      Agapito Page, PharmD  Transitions of Care Pharmacist  06/06/25     Secure Chat preferred   If no response call [phone] or Vocera \"Med Rec\"    "

## 2025-06-06 NOTE — CONSULTS
Inpatient consult to Medicine  Consult performed by: Praneeth Smith MD  Consult ordered by: Ruel Muller DDS      Reason For Consult  Comanagement of comorbidities after oral surgery, including HFpEF, a fib, CKD and T2DM    History Of Present Illness  Masoud Barger is a 74 y.o. male who is POD1 from R mandibular hardware removal, bone debridement with penrose drain placement. He has an extensive past medical history, below, which includes HFpEF, persistant a-fib, hx of stroke (2002), SSS s/p pacemaker, DMII on insulin at home, CKD stage 3, and hypothyroidism.     Patient previously had am impacted wisdom tooth extraction in July 2024 complicated by fracture repaired with ORIF of the right mandible. Subsequent healing course has been slow and complicated with intra oral granulation tissue and a persistent pain and ache of the right mandible. Dt OMFS c/f for chronic osteomyelitis and failed hardware following initial procedure, he underwent procedure for this admission. From OMFS perspective he is doing well.     With regard to his co-morbidities his most recent ECHO from OSH in April showed normal LV function, EF = 63 ± 5%, stage II left ventricular diastolic abnormality, RV dilation and low-normal RRV systolic function. Severe LA dilation, RA dilation,  moderate (2+) mitral valve regurgitation. At home he works closely with his outpatient doctors with regard to his diuretic plan, and had a recent hospitalization for ADHF. He currently is taking 80mg lasix ONCE a day, sometimes adding 40mg in the afternoon. He additionally takes spironolactone, hydralazine and imdur. He had previously taken beta blockers but experienced side effects and worsening swelling so this was discontinued by outside team. He has chronic leg swelling and CKD associated with his HF. Her current creatinine baseline if 1.5-1.6 per outside team, therefore CKD stage 3. His dry weight is around 317-322lbs.    For his diabetes he takes 75 units  of Toujeo in the morning (taking LA at night lead to hypoglcemia in the past) and then is on scheduled humalog 4-5u with breakfast, 10u with lunch and 10u with dinner, with sliding scale if needed. His average glucose level last 90 days per his continuous monitor is around 150. He also takes Trulicity - the last time he had it was two Saturdays ago. (1 week, 6 days ago).     He says he is always in atrial fibrillation and has been taking eliquis since his heart surgery in 2018. Hx of GI bleed when he was on DAPT + eliquis.     Extensive med list was confirmed with pharmacy and patient at bedside. Confirmed as in pharmacy reconciliation.      Past Medical History  He has a past medical history of A-fib (Multi), Acute on chronic heart failure with preserved ejection fraction (HFpEF) (03/29/2025), Anemia, Arrhythmia, Arthritis, Asthma, Bradycardia, Carpal tunnel syndrome, Cataract, Cerebral vascular accident (Multi), CHF (congestive heart failure), Chronic pain disorder, CKD (chronic kidney disease), Complete heart block, Coronary artery disease, ED (erectile dysfunction), GERD (gastroesophageal reflux disease), GI (gastrointestinal bleed) (2018), Heart valve disease, History of blood transfusion, Hyperlipidemia, Hypertension, Hypothyroidism, Mandible fracture (Multi), Obesity, Palpitations, Peripheral neuropathy, PONV (postoperative nausea and vomiting), Presence of cardiac pacemaker, Rectal bleeding, Sleep apnea, SSS (sick sinus syndrome) (Multi), Stroke (Multi) (2002), Testicular cancer (Multi), Thyroid nodule, Type 2 diabetes mellitus, and Vision loss.    Surgical History  He has a past surgical history that includes Coronary artery bypass graft (02/21/2018); Cardioversion (02/17/2020); Cardioversion (01/29/2020); Cardiac catheterization (04/14/2003); Cardiac catheterization (02/20/2018); Cardiac catheterization (09/30/2021); Mitral valve repair (02/2018); Orchiectomy simple / partial / radical w/ scrotal /  inguinal approach; Insert / replace / remove pacemaker (03/01/2018); Cholecystectomy (12/28/2006); Hand surgery (04/20/2023); Tonsillectomy; ORIF wrist fracture; Orchiectomy; Mandible fracture surgery (Right, 08/01/2024); Upper gastrointestinal endoscopy (04/16/2024); and Colonoscopy w/ biopsies.     Social History  He reports that he has never smoked. He has never used smokeless tobacco. He reports that he does not currently use alcohol. He reports that he does not use drugs.    Family History  Family History[1]     Allergies  Shellfish containing products, Adhesive tape-silicones, Levaquin [levofloxacin], Shellfish derived, Voltaren [diclofenac sodium], Voltaren [diclofenac sodium], and Adhesive tape-silicones     Physical Exam  Physical Exam  Constitutional:       General: He is not in acute distress.     Appearance: He is obese. He is not ill-appearing or toxic-appearing.   HENT:      Mouth/Throat:      Mouth: Mucous membranes are moist.   Eyes:      Extraocular Movements: Extraocular movements intact.      Pupils: Pupils are equal, round, and reactive to light.   Cardiovascular:      Rate and Rhythm: Normal rate.      Pulses: Normal pulses.      Heart sounds: Normal heart sounds.   Pulmonary:      Effort: Pulmonary effort is normal.      Breath sounds: Normal breath sounds.   Abdominal:      Palpations: Abdomen is soft.      Comments: Obese abdomen   Musculoskeletal:      Cervical back: Normal range of motion.      Right lower leg: Edema present.      Left lower leg: Edema present.      Comments: Trace to 1+ pitting edema LE bilateral   Skin:     General: Skin is warm and dry.   Neurological:      General: No focal deficit present.      Mental Status: He is alert and oriented to person, place, and time. Mental status is at baseline.   Psychiatric:         Mood and Affect: Mood normal.         Behavior: Behavior normal.           Last Recorded Vitals  /76   Pulse 66   Temp 36.3 °C (97.3 °F) (Temporal)    Resp 16   Wt 146 kg (322 lb 6.4 oz)   SpO2 98%     Relevant Results  Results for orders placed or performed during the hospital encounter of 06/05/25 (from the past 96 hours)   POCT GLUCOSE   Result Value Ref Range    POCT Glucose 126 (H) 74 - 99 mg/dL   Cardiac device check - Surgery   Result Value Ref Range    BSA 2.69 m2   Tissue/Wound Culture/Smear    Specimen: ABSCESS; Tissue   Result Value Ref Range    Tissue/Wound Culture/Smear No growth to date     Gram Stain (2+) Few Polymorphonuclear leukocytes     Gram Stain No organisms seen    Tissue/Wound Culture/Smear    Specimen: ABSCESS; Tissue   Result Value Ref Range    Tissue/Wound Culture/Smear Culture in progress     Gram Stain No polymorphonuclear leukocytes seen     Gram Stain No organisms seen    Tissue/Wound Culture/Smear    Specimen: ABSCESS; Tissue   Result Value Ref Range    Tissue/Wound Culture/Smear Culture in progress     Gram Stain No polymorphonuclear leukocytes seen (A)     Gram Stain (A)      (3+) Moderate Mixed Gram positive and Gram negative bacteria   POCT GLUCOSE   Result Value Ref Range    POCT Glucose 124 (H) 74 - 99 mg/dL   POCT GLUCOSE   Result Value Ref Range    POCT Glucose 189 (H) 74 - 99 mg/dL   POCT GLUCOSE   Result Value Ref Range    POCT Glucose 260 (H) 74 - 99 mg/dL   POCT GLUCOSE   Result Value Ref Range    POCT Glucose 270 (H) 74 - 99 mg/dL   POCT GLUCOSE   Result Value Ref Range    POCT Glucose 228 (H) 74 - 99 mg/dL   POCT GLUCOSE   Result Value Ref Range    POCT Glucose 210 (H) 74 - 99 mg/dL   CBC   Result Value Ref Range    WBC 11.7 (H) 4.4 - 11.3 x10*3/uL    nRBC 0.0 0.0 - 0.0 /100 WBCs    RBC 3.96 (L) 4.50 - 5.90 x10*6/uL    Hemoglobin 9.2 (L) 13.5 - 17.5 g/dL    Hematocrit 28.3 (L) 41.0 - 52.0 %    MCV 72 (L) 80 - 100 fL    MCH 23.2 (L) 26.0 - 34.0 pg    MCHC 32.5 32.0 - 36.0 g/dL    RDW 21.3 (H) 11.5 - 14.5 %    Platelets 161 150 - 450 x10*3/uL   Magnesium   Result Value Ref Range    Magnesium 2.06 1.60 - 2.40 mg/dL    Renal function panel   Result Value Ref Range    Glucose 181 (H) 74 - 99 mg/dL    Sodium 135 (L) 136 - 145 mmol/L    Potassium 4.7 3.5 - 5.3 mmol/L    Chloride 98 98 - 107 mmol/L    Bicarbonate 27 21 - 32 mmol/L    Anion Gap 15 10 - 20 mmol/L    Urea Nitrogen 43 (H) 6 - 23 mg/dL    Creatinine 1.58 (H) 0.50 - 1.30 mg/dL    eGFR 46 (L) >60 mL/min/1.73m*2    Calcium 9.5 8.6 - 10.6 mg/dL    Phosphorus 4.1 2.5 - 4.9 mg/dL    Albumin 4.0 3.4 - 5.0 g/dL   POCT GLUCOSE   Result Value Ref Range    POCT Glucose 179 (H) 74 - 99 mg/dL       Relevant Imaging/Studies  OSH Echo 4/2025  [facility] CARDIOLOGY - 04/01/2025 2:54 PM EDT   CONCLUSIONS:  - Technically difficult exam due to body habitus.  - Exam indication: Re-evaluation of known heart failure with a change in clinical  status without change in med/diet  - The left ventricle is normal in size. Left ventricular systolic function is  normal. EF = 63 ± 5% (2D biplane) Definity contrast used for endocardial border  detection.  - The right ventricle is dilated. Right ventricular systolic function is low  normal.  - The left atrial cavity is severely dilated.  - The right atrial cavity is dilated.  - Post mitral valve repair. There is moderate (2+) mitral valve regurgitation.  Regurgitant orifice area (PISA) is 0.23 cm². The peak gradient is 18 mmHg and the  mean gradient is 6 mmHg.  - There is no patent foramen ovale as detected by Doppler.  Addendum  1. See data above  2. Normal right and left ventricular systolic function  3. Redundancy noted at the level of mitral valve with moderate mitral  regurgitation  4. Stage II left ventricular diastolic abnormality  5. Suboptimal study  - Exam was compared with the prior  echocardiographic exam performed on  03/09/2023      Electronically signed by Anshul Solorzano MD on 4/1/2025 at 2:54:04 PM       Assessment/Plan   Masoud Barger is a 74 y.o. male who is POD1 from R mandibular hardware removal, bone debridement with  penrose drain placement. He has an extensive past medical history, below, which includes HFpEF, persistant a-fib, hx of stroke (), SSS s/p pacemaker, DMII on insulin at home, CKD stage 3, and hypothyroidism. We are consulted by OMFS for general co-management.     At this time patient is stable with regard to his co-morbidities. We will follow daily to provide general recommendations.    Plan  #HFpEF  #CAD hs CABG 2018  #s/p MVr 2018 with ongoing moderate MR regurgitation  #HTN  - Routine EKG ordered for today  - Vitals q4  - Strict I&O  - Daily weights  - Please replete potassium to >4, magnesium >2  - Daily RFP, phosphorus, magnesium   - Sodium restricted, cardiac diet  - Restart home PO lasix 80mg daily (received one time this AM - start for tomorrow)  - Restart home hydralazine, imdur  - Restart home statin  - Restart home ASA per primary team  - HOLD home spironolactone     #Insulin requiring DM2  Home re units of Toujeo in the morning (taking LA at night lead to hypoglcemia in the past) and then is on scheduled humalog 4-5u with breakfast, 10u with lunch and 10u with dinner, with sliding scale if needed.   - Continue SSI#4  - Lantus 20u in the morning, will adjust as needed  - HOLD Trulicity while inpatient      #Persistent Afib  #Sick sinus s/p PM  Reasonable to hold eliquis if concern for perioperative hematoma   - Restart home eliquis ideally  or     #KULWINDER  -Patient brought his CPAP from home, okay to use here while sleeping.    #Admission related Constipation (Last BM )  - Consider bowel reg per patient preference (he indicated preference for prune juice)  ---Would rec 17g miralax daily + low dose senna/docusate    Other chronic issues  #GERD  #Hypothyroidism  - Please restart home anti-GERD medications  - Please restart home synthroid       Thank you for this consult. Will follow daily.    Patient seen and discussed with Dr. Smith.    Manuela Casanova MD  M-F 8am-4pm: Please reach out  via [facility] 4pm- 8am, 24h weekends: please page [phone] with any questions.            [1]   Family History  Problem Relation Name Age of Onset    Heart disease Mother      Hypertension Mother      Cancer Father      Hypertension Sister      Heart disease Brother      Heart attack Brother      Hypertension Brother

## 2025-06-07 VITALS
HEART RATE: 58 BPM | HEIGHT: 70 IN | TEMPERATURE: 97.2 F | OXYGEN SATURATION: 98 % | RESPIRATION RATE: 15 BRPM | WEIGHT: 315 LBS | SYSTOLIC BLOOD PRESSURE: 156 MMHG | BODY MASS INDEX: 45.1 KG/M2 | DIASTOLIC BLOOD PRESSURE: 73 MMHG

## 2025-06-07 LAB
ALBUMIN SERPL BCP-MCNC: 4 G/DL (ref 3.4–5)
ANION GAP SERPL CALC-SCNC: 13 MMOL/L (ref 10–20)
BUN SERPL-MCNC: 52 MG/DL (ref 6–23)
CALCIUM SERPL-MCNC: 9.1 MG/DL (ref 8.6–10.6)
CHLORIDE SERPL-SCNC: 98 MMOL/L (ref 98–107)
CO2 SERPL-SCNC: 28 MMOL/L (ref 21–32)
CREAT SERPL-MCNC: 1.58 MG/DL (ref 0.5–1.3)
EGFRCR SERPLBLD CKD-EPI 2021: 46 ML/MIN/1.73M*2
GLUCOSE SERPL-MCNC: 145 MG/DL (ref 74–99)
MAGNESIUM SERPL-MCNC: 2.1 MG/DL (ref 1.6–2.4)
PHOSPHATE SERPL-MCNC: 4.3 MG/DL (ref 2.5–4.9)
POTASSIUM SERPL-SCNC: 4.4 MMOL/L (ref 3.5–5.3)
SODIUM SERPL-SCNC: 135 MMOL/L (ref 136–145)

## 2025-06-07 PROCEDURE — 2500000001 HC RX 250 WO HCPCS SELF ADMINISTERED DRUGS (ALT 637 FOR MEDICARE OP)

## 2025-06-07 PROCEDURE — 83735 ASSAY OF MAGNESIUM: CPT

## 2025-06-07 PROCEDURE — 80069 RENAL FUNCTION PANEL: CPT

## 2025-06-07 PROCEDURE — 2500000001 HC RX 250 WO HCPCS SELF ADMINISTERED DRUGS (ALT 637 FOR MEDICARE OP): Performed by: DENTIST

## 2025-06-07 PROCEDURE — 2500000004 HC RX 250 GENERAL PHARMACY W/ HCPCS (ALT 636 FOR OP/ED)

## 2025-06-07 PROCEDURE — 36415 COLL VENOUS BLD VENIPUNCTURE: CPT

## 2025-06-07 PROCEDURE — 2500000005 HC RX 250 GENERAL PHARMACY W/O HCPCS

## 2025-06-07 RX ORDER — AMOXICILLIN AND CLAVULANATE POTASSIUM 875; 125 MG/1; MG/1
875 TABLET, FILM COATED ORAL 2 TIMES DAILY
Qty: 20 TABLET | Refills: 0 | Status: SHIPPED | OUTPATIENT
Start: 2025-06-07 | End: 2025-06-17

## 2025-06-07 RX ORDER — TRAMADOL HYDROCHLORIDE 50 MG/1
50 TABLET, FILM COATED ORAL EVERY 6 HOURS PRN
Qty: 20 TABLET | Refills: 0 | Status: SHIPPED | OUTPATIENT
Start: 2025-06-07

## 2025-06-07 RX ORDER — ACETAMINOPHEN 325 MG/1
650 TABLET ORAL EVERY 6 HOURS PRN
Qty: 40 TABLET | Refills: 1 | Status: SHIPPED | OUTPATIENT
Start: 2025-06-07

## 2025-06-07 RX ORDER — BACITRACIN ZINC 500 UNIT/G
OINTMENT (GRAM) TOPICAL 2 TIMES DAILY
Qty: 14 G | Refills: 1 | Status: SHIPPED | OUTPATIENT
Start: 2025-06-07

## 2025-06-07 RX ORDER — TALC
3 POWDER (GRAM) TOPICAL NIGHTLY
Status: DISCONTINUED | OUTPATIENT
Start: 2025-06-07 | End: 2025-06-07 | Stop reason: HOSPADM

## 2025-06-07 RX ORDER — INSULIN GLARGINE 100 [IU]/ML
INJECTION, SOLUTION SUBCUTANEOUS
Status: DISCONTINUED
Start: 2025-06-07 | End: 2025-06-07 | Stop reason: HOSPADM

## 2025-06-07 RX ADMIN — HYDRALAZINE HYDROCHLORIDE 25 MG: 25 TABLET ORAL at 00:59

## 2025-06-07 RX ADMIN — LEVOTHYROXINE SODIUM 25 MCG: 0.03 TABLET ORAL at 06:19

## 2025-06-07 RX ADMIN — ENOXAPARIN SODIUM 40 MG: 100 INJECTION SUBCUTANEOUS at 08:53

## 2025-06-07 RX ADMIN — CHLORHEXIDINE GLUCONATE, 0.12% ORAL RINSE 15 ML: 1.2 SOLUTION DENTAL at 08:53

## 2025-06-07 RX ADMIN — ACETAMINOPHEN 650 MG: 325 TABLET ORAL at 03:02

## 2025-06-07 RX ADMIN — ISOSORBIDE MONONITRATE 30 MG: 30 TABLET, EXTENDED RELEASE ORAL at 08:53

## 2025-06-07 RX ADMIN — PANTOPRAZOLE SODIUM 40 MG: 40 TABLET, DELAYED RELEASE ORAL at 06:19

## 2025-06-07 RX ADMIN — AMPICILLIN SODIUM AND SULBACTAM SODIUM 3 G: 2; 1 INJECTION, POWDER, FOR SOLUTION INTRAMUSCULAR; INTRAVENOUS at 08:53

## 2025-06-07 RX ADMIN — GABAPENTIN 300 MG: 300 CAPSULE ORAL at 08:53

## 2025-06-07 RX ADMIN — Medication 3 MG: at 00:59

## 2025-06-07 RX ADMIN — AMPICILLIN SODIUM AND SULBACTAM SODIUM 3 G: 2; 1 INJECTION, POWDER, FOR SOLUTION INTRAMUSCULAR; INTRAVENOUS at 02:53

## 2025-06-07 RX ADMIN — HYDRALAZINE HYDROCHLORIDE 25 MG: 25 TABLET ORAL at 08:53

## 2025-06-07 ASSESSMENT — COGNITIVE AND FUNCTIONAL STATUS - GENERAL
DRESSING REGULAR LOWER BODY CLOTHING: A LITTLE
DRESSING REGULAR UPPER BODY CLOTHING: A LITTLE
HELP NEEDED FOR BATHING: A LITTLE
DAILY ACTIVITIY SCORE: 21
STANDING UP FROM CHAIR USING ARMS: A LITTLE
MOVING TO AND FROM BED TO CHAIR: A LITTLE
TURNING FROM BACK TO SIDE WHILE IN FLAT BAD: A LITTLE
MOBILITY SCORE: 21

## 2025-06-07 ASSESSMENT — PAIN SCALES - GENERAL: PAINLEVEL_OUTOF10: 2

## 2025-06-07 NOTE — DISCHARGE SUMMARY
Discharge Diagnosis  Mandibular hardware failure - osteomyelitis    Issues Requiring Follow-Up  Surgical path/culture  Post surgical follow up    Test Results Pending At Discharge  Pending Labs       Order Current Status    Fungal Culture/Smear Preliminary result    Fungal Culture/Smear Preliminary result    Tissue/Wound Culture/Smear Preliminary result    Tissue/Wound Culture/Smear Preliminary result    Tissue/Wound Culture/Smear Preliminary result            Hospital Course  Patient arrived to [facility] on HOD 1 and presented to PACU in anticipation of procedure. Patient was greeted by surgical, nursing, and anesthesia teams. Patient underwent removal of right mandibular hardware, debridement of right mandible, and placement of penrose drain in right neck. Patient subsequently recovered in PACU and was transferred to [facility] for further recovery. No acute events overnight. Medicine team was consulted to provide assistance in medical management during inpatient stay - appreciate recs. Patient continued recovery on [facility] during HOD 2. No acute events overnight. On HOD 3, penrose drain was removed from right neck bedside and patient was discharged home.     Pertinent Physical Exam At Time of Discharge  Physical Exam  Constitutional: AAOX3, resting comfortably in bed  NEURO: Alert and oriented x3, no gross motor or sensory deficits.  PULM: Breathing comfortably on RA  GI: Abd soft, nontender, nondistended,  Skin: Warm and dry. No rashes or lesions noted.  Eyes: PERRL, EOMI. clear sclera  Head:  Right CN V3 hypoesthesia  Mild right marginal mandibular branch of CN VII weakness  Otherwise CN V and VII intact and equal b/l  Right facial swelling - concentrated at right cheek - mildly TTP - mildly firm, largely soft to palpation  Inferior border of mandible palpable b/l  Neck:  Soft to palpation b/l  Extra oral incision CDI  No drainage appreciated from site of previous penrose drain placement  Mouth:  JALEEL 35 mm  Maxillary  complete denture in place  FOM soft non elevated b/l  Intraoral suture intact - no gingival swelling, drainage, purulence  Extremities: SMITH  PSYCH: Appropriate mood and behavior      Home Medications     Medication List      START taking these medications     amoxicillin-clavulanate 875-125 mg tablet; Commonly known as: Augmentin;   Take 1 tablet (875 mg of amoxicillin) by mouth 2 times a day for 10 days.   bacitracin 500 unit/gram ointment; Apply topically 2 times a day. Apply   to extra oral wound 2x day   traMADol 50 mg tablet; Commonly known as: Ultram; Take 1 tablet (50 mg)   by mouth every 6 hours if needed for severe pain (7 - 10).     CHANGE how you take these medications     * Tylenol Extra Strength 500 mg tablet; Generic drug: acetaminophen;   What changed: Another medication with the same name was added. Make sure   you understand how and when to take each.   * acetaminophen 325 mg tablet; Commonly known as: Tylenol; Take 2   tablets (650 mg) by mouth every 6 hours if needed for mild pain (1 - 3).;   What changed: You were already taking a medication with the same name, and   this prescription was added. Make sure you understand how and when to take   each.  * This list has 2 medication(s) that are the same as other medications   prescribed for you. Read the directions carefully, and ask your doctor or   other care provider to review them with you.     CONTINUE taking these medications     ascorbic acid 500 mg tablet; Commonly known as: Vitamin C   aspirin 81 mg capsule   atorvastatin 40 mg tablet; Commonly known as: Lipitor   cetirizine 10 mg tablet; Commonly known as: ZyrTEC   ELDERBERRY FRUIT ORAL   Eliquis 2.5 mg tablet; Generic drug: apixaban   famotidine 40 mg tablet; Commonly known as: Pepcid   ferrous sulfate 325 (65 Fe) mg EC tablet   Fish OiL 1,000 (120-180) mg capsule; Generic drug: omega 3-dha-epa-fish   oil   furosemide 80 mg tablet; Commonly known as: Lasix   gabapentin 300 mg capsule;  Commonly known as: Neurontin   GLUCOSAMINE CHONDROITIN ORAL   Gvoke HypoPen 2-Pack 1 mg/0.2 mL auto-injector; Generic drug: glucagon   HumaLOG KwikPen Insulin 100 unit/mL pen; Generic drug: insulin lispro   hydrALAZINE 25 mg tablet; Commonly known as: Apresoline   isosorbide mononitrate ER 30 mg 24 hr tablet; Commonly known as: Imdur   levothyroxine 25 mcg tablet; Commonly known as: Synthroid, Levoxyl   magnesium oxide 400 mg (241.3 mg elemental) tablet; Commonly known as:   Mag-Ox   melatonin 1 mg tablet,chewable   omeprazole 40 mg DR capsule; Commonly known as: PriLOSEC   POTASSIUM PO   spironolactone 25 mg tablet; Commonly known as: Aldactone   Toujeo SoloStar U-300 Insulin 300 unit/mL (1.5 mL) pen; Generic drug:   insulin glargine   Trulicity 3 mg/0.5 mL injection; Generic drug: dulaglutide   TURMERIC ORAL   ZINC ACETATE ORAL       Outpatient Follow-Up  Patient to follow up with OMFS at outpatient clinic located at Department of Oral & Maxillofacial Surgery  [address] [place] [address], 1st Floor ([facility])  [place] [address]    Office phone number: [phone].  Office fax number: [phone].  Team Pager: [phone].  Patients can contact the [id number] through the hospital  [phone].      Gerhard Dodd, DMD

## 2025-06-07 NOTE — CARE PLAN
Problem: Heart Failure  Goal: Improved gas exchange this shift  Outcome: Progressing  Goal: Improved urinary output this shift  Outcome: Progressing  Goal: Reduction in peripheral edema within 24 hours  Outcome: Progressing  Goal: Report improvement of dyspnea/breathlessness this shift  Outcome: Progressing  Goal: Weight from fluid excess reduced over 2-3 days, then stabilize  Outcome: Progressing  Goal: Increase self care and/or family involvement in 24 hours  Outcome: Progressing     Problem: Pain - Adult  Goal: Verbalizes/displays adequate comfort level or baseline comfort level  Outcome: Progressing     Problem: Safety - Adult  Goal: Free from fall injury  Outcome: Progressing

## 2025-06-07 NOTE — NURSING NOTE
Pt discharged to home from [facility].  No issues.  Medication reconciliation complete.  No further questions.  Transport took to lobby.

## 2025-06-07 NOTE — DISCHARGE INSTRUCTIONS
DEPARTMENT OF ORAL & MAXILLOFACIAL SURGERY  DISCHARGE INSTRUCTIONS    C O N F I D E N T I A L   I N F O R M A T I O N  -------------------------------------------------------------------------------------------------------------------    Masoud A Charbel  [id number]    The following is a brief overview of your hospitalization. Some of the information contained on this summary may be confidential.  This information should be kept in your records and should be shared with your regular doctor.    Admission Date:6/5/2025  5:10 AM  Discharge Date: No discharge date for patient encounter.    Disposition:  Home    PRINCIPAL DIAGNOSIS (reason after study for this admission): right mandibular hardware failure - osteomyelitis    Physicians:   Dr. Muller    Operations performed while hospitalized:   Removal of right mandibular hardware, debridement of right mandibe    Additional findings:   N/a    Treatment/wound care:   Keep area(s) clean and dry.   It is okay to shower 48 hours after time of surgery.    Do not scrub wound(s), pat dry.    Do not submerge wound(s) in standing water until seen in followup (no tub bathing, swimming, or hot tubs).    Please visually inspect your wound(s) at least once daily.  If the wound(s) are in a difficult to see location, please use a mirror or have someone else assist with visual inspection.    If you have sutures that you can see outside of the skin or staples:  Please have staples/sutures removed in our [id number] days after the date of surgery.  Do not remove the staples/sutures on your own.  Return sooner or call if wound(s) or surrounding area have increased swelling, pain, warmth, redness, or drainage that is thick, yellow and/or green.    Pending results:     Surigal path/cultures    Pain Control:    Tylenol 650 mg q6h  Tramadol 50 mg q6h PRN pain    Activity after Discharge:   No heavy lifting, weight bearing as tolerated, no driving until mobility is returned to normal.   Do not  submerge wound(s) in standing water for 7 days after surgery (no tub bathing, swimming, or hot tubs).   Do not drive or operate heavy machinery while taking narcotic pain medications as these medications can alter perception, impair judgement, and slow reaction times.    Diet: Soft Diet    Additional Instructions:   Follow up to occur at [facility] located at Department of Oral & Maxillofacial Surgery  [address] [place] [address] Ave, [address]st Floor ([facility])  [place] [address]    Office phone number: [phone].  Office fax number: [phone].  Team Pager: [phone].  Patients can contact the [id number] through the hospital  [phone].

## 2025-06-07 NOTE — CARE PLAN
The patient's goals for the shift include      The clinical goals for the shift include pt will remain hemodynamically stable      Problem: Chronic Conditions and Co-morbidities  Goal: Patient's chronic conditions and co-morbidity symptoms are monitored and maintained or improved  Outcome: Progressing     Problem: Heart Failure  Goal: Report improvement of dyspnea/breathlessness this shift  Outcome: Progressing     Problem: Heart Failure  Goal: Improved gas exchange this shift  Outcome: Progressing

## 2025-06-07 NOTE — PROGRESS NOTES
Masoud Barger is a 74 y.o. male on day 2 of admission presenting with Longstanding persistent atrial fibrillation (Multi).    Transitional Care Coordinator Note: Spoke with patient to discuss discharge planning s/p admission. Patient will discharge home today via private vehicle. Patient with NN.  Lisa Miranda RN TCC via Epic.

## 2025-06-08 LAB
BACTERIA SPEC CULT: ABNORMAL
FUNGUS SPEC CULT: NORMAL
FUNGUS SPEC CULT: NORMAL
FUNGUS SPEC FUNGUS STN: NORMAL
FUNGUS SPEC FUNGUS STN: NORMAL
GRAM STN SPEC: ABNORMAL

## 2025-06-10 LAB
BACTERIA SPEC CULT: ABNORMAL
BACTERIA SPEC CULT: ABNORMAL
FUNGUS SPEC CULT: NORMAL
FUNGUS SPEC CULT: NORMAL
FUNGUS SPEC FUNGUS STN: NORMAL
FUNGUS SPEC FUNGUS STN: NORMAL
GRAM STN SPEC: ABNORMAL
GRAM STN SPEC: ABNORMAL

## 2025-06-12 LAB
ATRIAL RATE: 107 BPM
Q ONSET: 209 MS
QRS COUNT: 11 BEATS
QRS DURATION: 150 MS
QT INTERVAL: 462 MS
QTC CALCULATION(BAZETT): 498 MS
QTC FREDERICIA: 486 MS
R AXIS: 119 DEGREES
T AXIS: -13 DEGREES
T OFFSET: 440 MS
VENTRICULAR RATE: 70 BPM

## 2025-06-16 LAB
FUNGUS SPEC CULT: NORMAL
FUNGUS SPEC CULT: NORMAL
FUNGUS SPEC FUNGUS STN: NORMAL
FUNGUS SPEC FUNGUS STN: NORMAL

## 2025-06-23 LAB
FUNGUS SPEC CULT: NORMAL
FUNGUS SPEC CULT: NORMAL
FUNGUS SPEC FUNGUS STN: NORMAL
FUNGUS SPEC FUNGUS STN: NORMAL

## (undated) DEVICE — FLOSEAL, MATRIX, HEMOSTATIC, FULL STERILE PREP, 5ML

## (undated) DEVICE — COVER, TABLE, 44 X 75 IN, DISPOSABLE, LF, STERILE

## (undated) DEVICE — Device

## (undated) DEVICE — BOWL, BASIN, 32 OZ, STERILE

## (undated) DEVICE — SUCTION, VERSALIGHT MINI W/EXTENDED FRAZIER TIP

## (undated) DEVICE — SCISSORS, WIRE CUTTER, 4 IN, STERILE, DISP

## (undated) DEVICE — CATHETER, URETHRAL, PEZZER, 3 CM HEAD, 36 FR, LATEX

## (undated) DEVICE — TOWEL, SURGICAL, NEURO, O/R, 16 X 26, BLUE, STERILE

## (undated) DEVICE — DRILL BIT, 1.6MM, CROSS-CUT FISSURE

## (undated) DEVICE — REST, HEAD, BAGEL, 9 IN

## (undated) DEVICE — SUTURE, POLYSORB, 4-0, 18 IN, P12, UNDYED

## (undated) DEVICE — DRESSING, SPONGE, GAUZE, CURITY, 4 X 4 IN, STERILE

## (undated) DEVICE — DRESSING, TRANSPARENT, TEGADERM, 4 X 4-3/4 IN

## (undated) DEVICE — GOWN, ASTOUND, XL

## (undated) DEVICE — SUTURE, VICRYL, 3-0,18 IN, SH, UNDYED

## (undated) DEVICE — CONTAINER, SPECIMEN, 120 ML, STERILE

## (undated) DEVICE — TISSEEL FIBRIN SEALANT, PRIMA, FROZEN, 4ML

## (undated) DEVICE — GLOVE, SURGICAL, PROTEXIS PI ORTHO, 7.5, PF, LF

## (undated) DEVICE — COVER, TABLE, HEAVY DUTY

## (undated) DEVICE — SUTURE, SILK, 2-0, 18 IN, BLACK

## (undated) DEVICE — BANDAGE, GAUZE, CONFORMING, KERLIX, 6 PLY, 4.5 IN X 4.1 YD

## (undated) DEVICE — MANIFOLD, 4 PORT NEPTUNE STANDARD

## (undated) DEVICE — SPONGE, LAP, XRAY DECT, 18IN X 18IN, W/MASTER DMT, STERILE

## (undated) DEVICE — CATHETER TRAY, SURESTEP, 16FR, URINE METER W/STATLOCK

## (undated) DEVICE — PAD, GROUNDING, ELECTROSURGICAL, W/9 FT CABLE, POLYHESIVE II, ADULT, LF

## (undated) DEVICE — BUR, SOLID OVAL, CARBIDE, MEDIUM, 5.5MM

## (undated) DEVICE — SUTURE, PLAIN, 5-0, 18 IN, PC1, YELLOW

## (undated) DEVICE — COVER, CART, 45 X 27 X 48 IN, CLEAR

## (undated) DEVICE — SUTURE, VICRYL, 4-0, 18 IN, UNDYED BR PS-2

## (undated) DEVICE — WOUND SYSTEM, DEBRIDEMENT & CLEANING, O.R DUOPAK

## (undated) DEVICE — DRILL, KLSM 2.2 X 115 W/NOTCH

## (undated) DEVICE — SUTURE, SILK, 2-0, 30 IN, SH, BLACK

## (undated) DEVICE — DRESSING, NON-ADHERENT, TELFA, OUCHLESS, 3 X 8 IN, STERILE

## (undated) DEVICE — DRESSING, GAUZE, DRAIN SPONGE, 6 PLY, EXCILON, 4 X 4 IN, STERILE

## (undated) DEVICE — TUBE, SALEM SUMP, 16 FR X 48IN, ENFIT

## (undated) DEVICE — SEALER, BIPOLAR, AQUA MANTYS MIS FLEX MINI

## (undated) DEVICE — BUR, EGG, TEN FLUTE, 4 MM, STAINLESS STEEL